# Patient Record
Sex: MALE | Race: BLACK OR AFRICAN AMERICAN | NOT HISPANIC OR LATINO | Employment: OTHER | URBAN - METROPOLITAN AREA
[De-identification: names, ages, dates, MRNs, and addresses within clinical notes are randomized per-mention and may not be internally consistent; named-entity substitution may affect disease eponyms.]

---

## 2023-10-24 ENCOUNTER — HOSPITAL ENCOUNTER (OUTPATIENT)
Facility: HOSPITAL | Age: 68
Discharge: HOME | End: 2023-11-06
Attending: EMERGENCY MEDICINE | Admitting: STUDENT IN AN ORGANIZED HEALTH CARE EDUCATION/TRAINING PROGRAM
Payer: MEDICARE

## 2023-10-24 DIAGNOSIS — T14.90XA TRAUMA: ICD-10-CM

## 2023-10-24 DIAGNOSIS — R00.1 BRADYCARDIA: ICD-10-CM

## 2023-10-24 DIAGNOSIS — S82.402A TIBIA/FIBULA FRACTURE, LEFT, CLOSED, INITIAL ENCOUNTER: Primary | ICD-10-CM

## 2023-10-24 DIAGNOSIS — S82.202A TIBIA/FIBULA FRACTURE, LEFT, CLOSED, INITIAL ENCOUNTER: Primary | ICD-10-CM

## 2023-10-24 DIAGNOSIS — Z01.818 PRE-OP EVALUATION: ICD-10-CM

## 2023-10-24 LAB
ABORH RETYPE: NORMAL
ALBUMIN SERPL-MCNC: 3.4 G/DL (ref 3.4–4.8)
ALBUMIN/GLOB SERPL: 1.3 RATIO (ref 1.1–2)
ALP SERPL-CCNC: 58 UNIT/L (ref 40–150)
ALT SERPL-CCNC: 25 UNIT/L (ref 0–55)
AST SERPL-CCNC: 24 UNIT/L (ref 5–34)
BASOPHILS # BLD AUTO: 0.04 X10(3)/MCL
BASOPHILS NFR BLD AUTO: 0.8 %
BILIRUB SERPL-MCNC: 0.7 MG/DL
BUN SERPL-MCNC: 20.1 MG/DL (ref 8.4–25.7)
CALCIUM SERPL-MCNC: 8.5 MG/DL (ref 8.8–10)
CHLORIDE SERPL-SCNC: 109 MMOL/L (ref 98–107)
CO2 SERPL-SCNC: 23 MMOL/L (ref 23–31)
CREAT SERPL-MCNC: 0.85 MG/DL (ref 0.73–1.18)
EOSINOPHIL # BLD AUTO: 0.06 X10(3)/MCL (ref 0–0.9)
EOSINOPHIL NFR BLD AUTO: 1.2 %
ERYTHROCYTE [DISTWIDTH] IN BLOOD BY AUTOMATED COUNT: 16 % (ref 11.5–17)
ETHANOL SERPL-MCNC: <10 MG/DL
GFR SERPLBLD CREATININE-BSD FMLA CKD-EPI: >60 MLS/MIN/1.73/M2
GLOBULIN SER-MCNC: 2.7 GM/DL (ref 2.4–3.5)
GLUCOSE SERPL-MCNC: 82 MG/DL (ref 82–115)
GROUP & RH: NORMAL
HCT VFR BLD AUTO: 38.4 % (ref 42–52)
HGB BLD-MCNC: 13 G/DL (ref 14–18)
IMM GRANULOCYTES # BLD AUTO: 0.01 X10(3)/MCL (ref 0–0.04)
IMM GRANULOCYTES NFR BLD AUTO: 0.2 %
INDIRECT COOMBS GEL: NORMAL
INR PPP: 1
LYMPHOCYTES # BLD AUTO: 1.2 X10(3)/MCL (ref 0.6–4.6)
LYMPHOCYTES NFR BLD AUTO: 23.4 %
MCH RBC QN AUTO: 28.3 PG (ref 27–31)
MCHC RBC AUTO-ENTMCNC: 33.9 G/DL (ref 33–36)
MCV RBC AUTO: 83.7 FL (ref 80–94)
MONOCYTES # BLD AUTO: 0.46 X10(3)/MCL (ref 0.1–1.3)
MONOCYTES NFR BLD AUTO: 9 %
NEUTROPHILS # BLD AUTO: 3.36 X10(3)/MCL (ref 2.1–9.2)
NEUTROPHILS NFR BLD AUTO: 65.4 %
NRBC BLD AUTO-RTO: 0 %
PLATELET # BLD AUTO: 218 X10(3)/MCL (ref 130–400)
PMV BLD AUTO: 10.2 FL (ref 7.4–10.4)
POTASSIUM SERPL-SCNC: 3.9 MMOL/L (ref 3.5–5.1)
PROT SERPL-MCNC: 6.1 GM/DL (ref 5.8–7.6)
PROTHROMBIN TIME: 13.4 SECONDS (ref 12.5–14.5)
RBC # BLD AUTO: 4.59 X10(6)/MCL (ref 4.7–6.1)
SODIUM SERPL-SCNC: 140 MMOL/L (ref 136–145)
SPECIMEN OUTDATE: NORMAL
WBC # SPEC AUTO: 5.13 X10(3)/MCL (ref 4.5–11.5)

## 2023-10-24 PROCEDURE — 63600175 PHARM REV CODE 636 W HCPCS: Performed by: EMERGENCY MEDICINE

## 2023-10-24 PROCEDURE — 11000001 HC ACUTE MED/SURG PRIVATE ROOM

## 2023-10-24 PROCEDURE — 99285 EMERGENCY DEPT VISIT HI MDM: CPT | Mod: 25

## 2023-10-24 PROCEDURE — 93010 ELECTROCARDIOGRAM REPORT: CPT | Mod: ,,, | Performed by: INTERNAL MEDICINE

## 2023-10-24 PROCEDURE — 80053 COMPREHEN METABOLIC PANEL: CPT | Performed by: EMERGENCY MEDICINE

## 2023-10-24 PROCEDURE — 85610 PROTHROMBIN TIME: CPT | Performed by: EMERGENCY MEDICINE

## 2023-10-24 PROCEDURE — 63600175 PHARM REV CODE 636 W HCPCS

## 2023-10-24 PROCEDURE — G0378 HOSPITAL OBSERVATION PER HR: HCPCS

## 2023-10-24 PROCEDURE — 96375 TX/PRO/DX INJ NEW DRUG ADDON: CPT | Mod: 59

## 2023-10-24 PROCEDURE — 25000003 PHARM REV CODE 250

## 2023-10-24 PROCEDURE — 25000003 PHARM REV CODE 250: Performed by: EMERGENCY MEDICINE

## 2023-10-24 PROCEDURE — 96372 THER/PROPH/DIAG INJ SC/IM: CPT | Mod: 59

## 2023-10-24 PROCEDURE — 86900 BLOOD TYPING SEROLOGIC ABO: CPT | Performed by: EMERGENCY MEDICINE

## 2023-10-24 PROCEDURE — 93005 ELECTROCARDIOGRAM TRACING: CPT | Mod: 59

## 2023-10-24 PROCEDURE — 85025 COMPLETE CBC W/AUTO DIFF WBC: CPT | Performed by: EMERGENCY MEDICINE

## 2023-10-24 PROCEDURE — 93010 EKG 12-LEAD: ICD-10-PCS | Mod: ,,, | Performed by: INTERNAL MEDICINE

## 2023-10-24 PROCEDURE — 96361 HYDRATE IV INFUSION ADD-ON: CPT

## 2023-10-24 PROCEDURE — 82077 ASSAY SPEC XCP UR&BREATH IA: CPT | Performed by: EMERGENCY MEDICINE

## 2023-10-24 PROCEDURE — 29505 APPLICATION LONG LEG SPLINT: CPT | Mod: LT

## 2023-10-24 RX ORDER — POLYETHYLENE GLYCOL 3350 17 G/17G
17 POWDER, FOR SOLUTION ORAL 2 TIMES DAILY
Status: DISCONTINUED | OUTPATIENT
Start: 2023-10-24 | End: 2023-11-06 | Stop reason: HOSPADM

## 2023-10-24 RX ORDER — METHOCARBAMOL 500 MG/1
500 TABLET, FILM COATED ORAL EVERY 8 HOURS
Status: DISCONTINUED | OUTPATIENT
Start: 2023-10-24 | End: 2023-11-03

## 2023-10-24 RX ORDER — SODIUM CHLORIDE, SODIUM LACTATE, POTASSIUM CHLORIDE, CALCIUM CHLORIDE 600; 310; 30; 20 MG/100ML; MG/100ML; MG/100ML; MG/100ML
INJECTION, SOLUTION INTRAVENOUS CONTINUOUS
Status: DISCONTINUED | OUTPATIENT
Start: 2023-10-24 | End: 2023-10-26

## 2023-10-24 RX ORDER — DOCUSATE SODIUM 100 MG/1
100 CAPSULE, LIQUID FILLED ORAL 2 TIMES DAILY
Status: DISCONTINUED | OUTPATIENT
Start: 2023-10-24 | End: 2023-11-06 | Stop reason: HOSPADM

## 2023-10-24 RX ORDER — PROPOFOL 10 MG/ML
INJECTION, EMULSION INTRAVENOUS
Status: COMPLETED
Start: 2023-10-24 | End: 2023-10-24

## 2023-10-24 RX ORDER — ENOXAPARIN SODIUM 100 MG/ML
40 INJECTION SUBCUTANEOUS EVERY 12 HOURS
Status: DISCONTINUED | OUTPATIENT
Start: 2023-10-24 | End: 2023-11-06 | Stop reason: HOSPADM

## 2023-10-24 RX ORDER — ADHESIVE BANDAGE
30 BANDAGE TOPICAL DAILY PRN
Status: DISCONTINUED | OUTPATIENT
Start: 2023-10-24 | End: 2023-11-06 | Stop reason: HOSPADM

## 2023-10-24 RX ORDER — CEFAZOLIN SODIUM 2 G/50ML
2 SOLUTION INTRAVENOUS
Status: DISCONTINUED | OUTPATIENT
Start: 2023-10-24 | End: 2023-10-24

## 2023-10-24 RX ORDER — GABAPENTIN 300 MG/1
300 CAPSULE ORAL 3 TIMES DAILY
Status: DISCONTINUED | OUTPATIENT
Start: 2023-10-24 | End: 2023-11-06 | Stop reason: HOSPADM

## 2023-10-24 RX ORDER — OXYCODONE HYDROCHLORIDE 5 MG/1
5 TABLET ORAL EVERY 4 HOURS PRN
Status: DISCONTINUED | OUTPATIENT
Start: 2023-10-24 | End: 2023-11-06 | Stop reason: HOSPADM

## 2023-10-24 RX ORDER — MORPHINE SULFATE 4 MG/ML
INJECTION, SOLUTION INTRAMUSCULAR; INTRAVENOUS
Status: COMPLETED
Start: 2023-10-24 | End: 2023-10-24

## 2023-10-24 RX ORDER — TALC
6 POWDER (GRAM) TOPICAL NIGHTLY PRN
Status: DISCONTINUED | OUTPATIENT
Start: 2023-10-24 | End: 2023-11-06 | Stop reason: HOSPADM

## 2023-10-24 RX ORDER — HYDROMORPHONE HYDROCHLORIDE 2 MG/ML
0.5 INJECTION, SOLUTION INTRAMUSCULAR; INTRAVENOUS; SUBCUTANEOUS
Status: COMPLETED | OUTPATIENT
Start: 2023-10-24 | End: 2023-10-24

## 2023-10-24 RX ORDER — ACETAMINOPHEN 325 MG/1
650 TABLET ORAL EVERY 4 HOURS
Status: DISCONTINUED | OUTPATIENT
Start: 2023-10-24 | End: 2023-10-25

## 2023-10-24 RX ORDER — OXYCODONE HYDROCHLORIDE 10 MG/1
10 TABLET ORAL EVERY 4 HOURS PRN
Status: DISCONTINUED | OUTPATIENT
Start: 2023-10-24 | End: 2023-11-06 | Stop reason: HOSPADM

## 2023-10-24 RX ORDER — ONDANSETRON 2 MG/ML
INJECTION INTRAMUSCULAR; INTRAVENOUS
Status: COMPLETED
Start: 2023-10-24 | End: 2023-10-24

## 2023-10-24 RX ADMIN — GABAPENTIN 300 MG: 300 CAPSULE ORAL at 04:10

## 2023-10-24 RX ADMIN — ENOXAPARIN SODIUM 40 MG: 40 INJECTION SUBCUTANEOUS at 09:10

## 2023-10-24 RX ADMIN — GABAPENTIN 300 MG: 300 CAPSULE ORAL at 09:10

## 2023-10-24 RX ADMIN — ONDANSETRON 4 MG: 2 INJECTION INTRAMUSCULAR; INTRAVENOUS at 02:10

## 2023-10-24 RX ADMIN — MORPHINE SULFATE 10 MG: 4 INJECTION, SOLUTION INTRAMUSCULAR; INTRAVENOUS at 02:10

## 2023-10-24 RX ADMIN — SODIUM CHLORIDE 1000 ML: 9 INJECTION, SOLUTION INTRAVENOUS at 02:10

## 2023-10-24 RX ADMIN — METHOCARBAMOL 500 MG: 500 TABLET ORAL at 09:10

## 2023-10-24 RX ADMIN — PROPOFOL 100 MG: 10 INJECTION, EMULSION INTRAVENOUS at 03:10

## 2023-10-24 RX ADMIN — SODIUM CHLORIDE, POTASSIUM CHLORIDE, SODIUM LACTATE AND CALCIUM CHLORIDE: 600; 310; 30; 20 INJECTION, SOLUTION INTRAVENOUS at 08:10

## 2023-10-24 RX ADMIN — OXYCODONE HYDROCHLORIDE 10 MG: 10 TABLET ORAL at 08:10

## 2023-10-24 RX ADMIN — ACETAMINOPHEN 650 MG: 325 TABLET, FILM COATED ORAL at 09:10

## 2023-10-24 RX ADMIN — HYDROMORPHONE HYDROCHLORIDE 0.5 MG: 2 INJECTION INTRAMUSCULAR; INTRAVENOUS; SUBCUTANEOUS at 04:10

## 2023-10-24 NOTE — ED PROVIDER NOTES
Encounter Date: 10/24/2023    SCRIBE #1 NOTE: I, Nuria Swain, am scribing for, and in the presence of,  Lico Swan III, MD. I have scribed the following portions of the note - Other sections scribed: HPI, ROS, PE.       History     Chief Complaint   Patient presents with    Leg Pain     Pt reports falling through wood decking while building a wheelchair ramp, leg was caught, causing injury to LLE with deformity. Pt splinted by EMS [facility], CMS intact, pulse +2.      67 year old male presents to the ED via [facility] for leg fracture. Pt reports that he was on a wooden deck when he fell through it and got his foot caught between a piece of wood and concrete. He states that he was twisting trying to get out of it and injured his left leg. He denies hitting his head, neck pain, arm pain, chest pain, and SOB.     The history is provided by the patient. No  was used.     Review of patient's allergies indicates:  No Known Allergies  Past Medical History:   Diagnosis Date    Hypertension      History reviewed. No pertinent surgical history.  History reviewed. No pertinent family history.  Social History     Substance Use Topics    Alcohol use: Yes     Review of Systems   Respiratory:  Negative for shortness of breath.    Cardiovascular:  Negative for chest pain.   Musculoskeletal:  Negative for neck pain.        LLE pain. Denies arm pain       Physical Exam     Initial Vitals [10/24/23 1414]   BP Pulse Resp Temp SpO2   113/62 82 16 97.6 °F (36.4 °C) 97 %      MAP       --         Physical Exam    Nursing note and vitals reviewed.  Constitutional: No distress.   HENT:   Head: Normocephalic and atraumatic.   Neck: Trachea normal.   Cardiovascular:  Normal rate and regular rhythm.           No murmur heard.  Pulmonary/Chest: Breath sounds normal. No respiratory distress.   Abdominal: Abdomen is soft. Bowel sounds are normal. He exhibits no distension. There is no abdominal tenderness.    Musculoskeletal:         General: Normal range of motion.      Lumbar back: Normal.      Comments: Left mid shaft tib-fib deformity.      Neurological: He is alert and oriented to person, place, and time. He has normal strength. No cranial nerve deficit.   Skin: Skin is warm and dry. No rash noted.   Psychiatric: He has a normal mood and affect. Judgment normal.         ED Course   Procedural Sedation        Date/Time: 10/24/2023 4:27 PM    Performed by: Lico Swan III, MD  Authorized by: Lico Swan III, MD  ASA Class: Class 2 - Mild Illness without functional impairment.  Mallampati Score: Class 2 - Visualization of the soft palate, fauces, and uvula.     Equipment: on BP monitor., on CO2 monitor., on supplemental oxygen., suction available., on cardiac monitor., airway equipment available. and reversal drugs available.     Sedation type: moderate (conscious) sedation    Sedatives: propofol  Total Sedation Time (min): 10  Vitals: Vital signs were monitored during sedation.  Complications: No complications.       Orthopedic Injury    Date/Time: 10/24/2023 4:27 PM    Performed by: Lico Swan III, MD  Authorized by: Lico Swan III, MD    Location procedure was performed:  [facility] EMERGENCY DEPARTMENT  Injury:     Injury location:  Lower leg    Injury type:  Fracture    Fracture type: tibial and fibular shafts        Pre-procedure assessment:     Neurovascular status: Neurovascularly intact      Distal perfusion: normal      Neurological function: normal      Range of motion: normal      Local anesthesia used?: No      Patient sedated: See conscious sedation note.        Selections made in this section will also lock the Injury type section above.:     Immobilization:  Splint    Splint type:  Long leg    Supplies used:  Ortho-Glass    Technical Procedures Used:  Long leg stirrups with posterior traction  Post-procedure assessment:     Neurovascular status: Neurovascularly intact      Distal  perfusion: normal      Neurological function: normal      Range of motion: normal      Patient tolerance:  Patient tolerated the procedure well with no immediate complications    Labs Reviewed   CBC WITH DIFFERENTIAL - Abnormal; Notable for the following components:       Result Value    RBC 4.59 (*)     Hgb 13.0 (*)     Hct 38.4 (*)     All other components within normal limits   COMPREHENSIVE METABOLIC PANEL - Abnormal; Notable for the following components:    Chloride 109 (*)     Calcium Level Total 8.5 (*)     All other components within normal limits   PROTIME-INR - Normal   ALCOHOL,MEDICAL (ETHANOL) - Normal   CBC W/ AUTO DIFFERENTIAL    Narrative:     The following orders were created for panel order CBC auto differential.  Procedure                               Abnormality         Status                     ---------                               -----------         ------                     CBC with Differential[[id number]]       Abnormal            Final result                 Please view results for these tests on the individual orders.   URINALYSIS, REFLEX TO URINE CULTURE   TYPE & SCREEN   ABORH RETYPE          Imaging Results              X-Ray Tibia Fibula 2 View Left (Final result)  Result time 10/24/23 15:37:23      Final result by Shayy Carmichael MD (10/24/23 15:37:23)                   Impression:      Improved alignment of the tibial and fibular fractures following reduction.      Electronically signed by: Shayy Carmichael  Date:    10/24/2023  Time:    15:37               Narrative:    EXAMINATION:  XR TIBIA FIBULA 2 VIEW LEFT    CLINICAL HISTORY:  Injury, unspecified, initial encounter    COMPARISON:  X-rays dated 10/24/2023    FINDINGS:  There is improved alignment of the tibial and fibular diaphyseal fractures following reduction.  Splinting material is in place.                                       X-Ray Chest 1 View (Final result)  Result time 10/24/23 15:36:33      Final result by  Shayy Carmichael MD (10/24/23 15:36:33)                   Impression:      Left basilar subsegmental atelectasis.      Electronically signed by: Shayy Carmichael  Date:    10/24/2023  Time:    15:36               Narrative:    EXAMINATION:  XR CHEST 1 VIEW    CLINICAL HISTORY:  weakness;    TECHNIQUE:  AP chest    COMPARISON:  None.    FINDINGS:  The heart is normal in size.  There is left basilar subsegmental axis.  There is otherwise no focal airspace consolidation.  There is no pleural effusion or visible pneumothorax.                                       X-Ray Tibia Fibula 2 View Left (Final result)  Result time 10/24/23 15:00:09      Final result by Duane Muller MD (10/24/23 15:00:09)                   Impression:      Fractures as above.      Electronically signed by: Duane Muller  Date:    10/24/2023  Time:    15:00               Narrative:    EXAMINATION:  XR TIBIA FIBULA 2 VIEW LEFT    CLINICAL HISTORY:  Injury, unspecified, initial encounter    COMPARISON:  None.    FINDINGS:  Comminuted displace fractures of the proximal to midshaft of both the tibia and fibula with over riding of fracture fragments    Joint spaces preserved.    No blastic or lytic lesions.    Soft tissues within normal limits.                                       X-Ray Knee Complete 4 or More Views Left (Final result)  Result time 10/24/23 14:56:26      Final result by Duane Muller MD (10/24/23 14:56:26)                   Impression:      Fractures as above.      Electronically signed by: Duane Muller  Date:    10/24/2023  Time:    14:56               Narrative:    EXAMINATION:  XR KNEE COMP 4 OR MORE VIEWS LEFT    CLINICAL HISTORY:  Injury, unspecified, initial encounter    COMPARISON:  None.    FINDINGS:  Markedly displaced comminuted fracture involving the proximal to mid 3rd of the tibia and the proximal 3rd of the fibula with significant displacement and over riding of fracture fragments    There is  some narrowing of the medial compartment of the knee joint    No blastic or lytic lesions.    Soft tissues within normal limits.                                       X-Ray Ankle Complete Left (Final result)  Result time 10/24/23 15:11:56      Final result by Shayy Carmichael MD (10/24/23 15:11:56)                   Impression:      No acute abnormality identified in the ankle.      Electronically signed by: Shayy Carmichael  Date:    10/24/2023  Time:    15:11               Narrative:    EXAMINATION:  XR ANKLE COMPLETE 3 VIEW LEFT    CLINICAL HISTORY:  Injury, unspecified, initial encounter    COMPARISON:  None.    FINDINGS:  Splinting material obscures detail.  There is no acute fracture or malalignment in the ankle.  There are fractures of the mid tibia and fibular diaphyses.                                       Medications   lactated ringers infusion (has no administration in time range)   enoxaparin injection 40 mg (has no administration in time range)   acetaminophen tablet 650 mg (has no administration in time range)   oxyCODONE immediate release tablet 5 mg (has no administration in time range)   oxyCODONE immediate release tablet Tab 10 mg (has no administration in time range)   methocarbamoL tablet 500 mg (has no administration in time range)   gabapentin capsule 300 mg (has no administration in time range)   melatonin tablet 6 mg (has no administration in time range)   polyethylene glycol packet 17 g (has no administration in time range)   docusate sodium capsule 100 mg (has no administration in time range)   magnesium hydroxide 400 mg/5 ml suspension 2,400 mg (has no administration in time range)   HYDROmorphone (PF) injection 0.5 mg (has no administration in time range)   morphine 4 mg/mL injection (10 mg intravenous push Given 10/24/23 1435)   ondansetron 4 mg/2 mL injection (4 mg  Given 10/24/23 1446)   sodium chloride 0.9% bolus 1,000 mL 1,000 mL (1,000 mLs Intravenous New Bag 10/24/23 1447)    propofoL (DIPRIVAN) 10 mg/mL infusion (100 mg intravenous push New Bag 10/24/23 1501)     Medical Decision Making  Fracture dislocation    Patient with midshaft tib-fib fracture on x-ray neurovascularly intact but significant angulation and puckering of skin, decision made to sedate patient and reduce reduction successful discussed case with orthopedics and with Neurosurgery patient will be admitted for ORIF    Problems Addressed:  Tibia/fibula fracture, left, closed, initial encounter: complicated acute illness or injury that poses a threat to life or bodily functions    Amount and/or Complexity of Data Reviewed  External Data Reviewed: notes.  Labs: ordered.  Radiology: ordered.    Risk  Decision regarding hospitalization.              Attending Attestation:           Physician Attestation for Scribe:  Physician Attestation Statement for Scribe #1: I, Lico Swan III, MD, reviewed documentation, as scribed by Nuria Swain in my presence, and it is both accurate and complete.                             Clinical Impression:   Final diagnoses:  [T14.90XA] Trauma  [Z01.818] Pre-op evaluation  [S82.202A, S82.402A] Tibia/fibula fracture, left, closed, initial encounter (Primary)        ED Disposition Condition    Admit                 Lico Swan III, MD  10/24/23 9223

## 2023-10-24 NOTE — ED NOTES
Pt tolerated conscious sedation procedure well. 100mg Propofol IV push was given by Dr. Swan for procedure of LLE tib/fib reduction with splinting. Pt was bolused with 1L NS IVF. O2 was provided via NC at 4L with ETCO2 monitoring during procedure. Splint applied, CMS intact, pedal pulse palpable, +2, nail beds pink with cap refill <3 sec. Pt awakens post-procedure, following commands, oriented x3, no distress, pt placed on room air, able to maintain O2 sat above 95%. XR called for post-procedure films. Pt verbalizes improvement in pain. Pt remains on monitors, primary nurse, Rene Gamboa RN provided bedside report and will continue to monitor patient. Pt's family was updated by phone. Pt to be admitted for ortho services. Pt voices understanding of plan of care.

## 2023-10-24 NOTE — H&P
"   Trauma Surgery   History and Physical Note    Patient Name: Artemio Ramsey  YOB: 1955  Date: 10/24/2023 3:37 PM  Date of Admission: 10/24/2023  HD#0  POD#* No surgery found *    PRESENTING HISTORY   Chief Complaint/Reason for Admission: <principal problem not specified>    History of Present Illness:  Artemio Ramsey is a 67-year-old male who presents to the ED via air med for left tib-fib fracture.  Patient reports he was on a wooden deck when he tripped over it.  Complains of left leg pain.  Denies hitting his head.  Denies LOC.  Denies blood thinner use.    Review of Systems:  12 point ROS negative except as stated in HPI    PAST HISTORY:   Past medical history:  HLD  Denies taking home medications  Has not seen a PCP "in a while"    Past surgical history:  No past surgical history on file.    Family history:  No family history on file.    Social history:  Social History     Socioeconomic History    Marital status: Single     Social History     Tobacco Use   Smoking Status Not on file   Smokeless Tobacco Not on file      Social History     Substance and Sexual Activity   Alcohol Use Not on file        MEDICATIONS & ALLERGIES:   Allergies: Review of patient's allergies indicates:  No Known Allergies  Home Meds: No current outpatient medications   No current facility-administered medications on file prior to encounter.     No current outpatient medications on file prior to encounter.      No current facility-administered medications on file prior to encounter.     No current outpatient medications on file prior to encounter.     Scheduled Meds:   sodium chloride 0.9%  1,000 mL Intravenous ED 1 Time     Continuous Infusions:  PRN Meds:    OBJECTIVE:   Vital Signs:  VITAL SIGNS: 24 HR MIN & MAX LAST   Temp  Min: 97.6 °F (36.4 °C)  Max: 97.6 °F (36.4 °C)  97.6 °F (36.4 °C)   BP  Min: 81/48  Max: 131/76  115/67    Pulse  Min: 73  Max: 84  73    Resp  Min: 14  Max: 18  15    SpO2  Min: 96 %  Max: 100 %  " "100 %      HT: 5' 11" (180.3 cm)  WT: 68 kg (150 lb)  BMI: 20.9   Intake/output: No intake/output data recorded.     Lines/drains/airway:       Peripheral IV - Single Lumen 10/24/23 1420 20 G Anterior;Distal;Left Forearm (Active)   Number of days: 0       Physical Exam:  General:  Well developed, well nourished, no acute distress  HEENT:  Normocephalic, atraumatic  CV:  RR, 2+ DPs bilaterally  Resp/chest: NWOB  GI:  Abdomen soft, non-tender, non-distended  :  Deferred  MSK:  No muscle atrophy, cyanosis, peripheral edema, LLE reduced/splint - able to wiggle toes  Neuro: GCS 15. CNII-XII grossly intact, alert and oriented to person, place, and time. Strength and motor function grossly intact to all extremities, sensation intact to all extremities.    Labs:  Troponin:  No results for input(s): "TROPONINI" in the last 72 hours.  CBC:  Recent Labs     10/24/23  1457   WBC 5.13   RBC 4.59*   HGB 13.0*   HCT 38.4*      MCV 83.7   MCH 28.3   MCHC 33.9     CMP:  No results for input(s): "GLU", "CALCIUM", "ALBUMIN", "PROT", "NA", "K", "CO2", "CL", "BUN", "CREATININE", "ALKPHOS", "ALT", "AST", "BILITOT" in the last 72 hours.  Lactic Acid:  No results for input(s): "LACTATE" in the last 72 hours.  ETOH:  No results for input(s): "ETHANOL" in the last 72 hours.   Urine Drug Screen:  No results for input(s): "COCAINE", "OPIATE", "BARBITURATE", "AMPHETAMINE", "FENTANYL", "CANNABINOIDS", "MDMA" in the last 72 hours.    Invalid input(s): "BENZODIAZEPINE", "PHENCYCLIDINE"   ABG  No results for input(s): "PH", "PO2", "PCO2", "HCO3", "BE" in the last 168 hours.      Diagnostic Results:  X-Ray Tibia Fibula 2 View Left   Final Result      Fractures as above.         Electronically signed by: Duane Muller   Date:    10/24/2023   Time:    15:00      X-Ray Knee Complete 4 or More Views Left   Final Result      Fractures as above.         Electronically signed by: Duane Muller   Date:    10/24/2023   Time:    14:56    "   X-Ray Ankle Complete Left   Final Result      No acute abnormality identified in the ankle.         Electronically signed by: Shayy Carmichael   Date:    10/24/2023   Time:    15:11      X-Ray Tibia Fibula 2 View Left    (Results Pending)   X-Ray Chest 1 View    (Results Pending)       ASSESSMENT & PLAN:    Left tibia/fibula comminuted fracture        Admit to trauma floor  Consult orthopedic surgery, NWB to affected extremity until evaluated  NV checks  NPO  Daily labs  IS  PT/OT when able      10/24/2023 3:38 PM     The above findings, diagnostics, and treatment plan were discussed with Dr. Eduin Mendoza who will follow with further assessments and recommendations. Please call with any questions, concerns, or clinical status changes.  This note/OR report was created with the assistance of  voice recognition software or phone  dictation.  There may be transcription errors as a result of using this technology however minimal. Effort has been made to assure accuracy of transcription but any obvious errors or omissions should be clarified with the author of the document.

## 2023-10-25 ENCOUNTER — ANESTHESIA EVENT (OUTPATIENT)
Dept: SURGERY | Facility: HOSPITAL | Age: 68
End: 2023-10-25
Payer: MEDICARE

## 2023-10-25 ENCOUNTER — ANESTHESIA (OUTPATIENT)
Dept: SURGERY | Facility: HOSPITAL | Age: 68
End: 2023-10-25
Payer: MEDICARE

## 2023-10-25 PROBLEM — T14.90XA TRAUMA: Status: ACTIVE | Noted: 2023-10-25

## 2023-10-25 LAB
ALBUMIN SERPL-MCNC: 3.3 G/DL (ref 3.4–4.8)
ALBUMIN/GLOB SERPL: 1.2 RATIO (ref 1.1–2)
ALP SERPL-CCNC: 60 UNIT/L (ref 40–150)
ALT SERPL-CCNC: 31 UNIT/L (ref 0–55)
APPEARANCE UR: CLEAR
AST SERPL-CCNC: 42 UNIT/L (ref 5–34)
BACTERIA #/AREA URNS AUTO: NORMAL /HPF
BASOPHILS # BLD AUTO: 0.02 X10(3)/MCL
BASOPHILS NFR BLD AUTO: 0.4 %
BILIRUB SERPL-MCNC: 1.4 MG/DL
BILIRUB UR QL STRIP.AUTO: NEGATIVE
BUN SERPL-MCNC: 16.1 MG/DL (ref 8.4–25.7)
CALCIUM SERPL-MCNC: 8.5 MG/DL (ref 8.8–10)
CHLORIDE SERPL-SCNC: 106 MMOL/L (ref 98–107)
CO2 SERPL-SCNC: 24 MMOL/L (ref 23–31)
COLOR UR AUTO: YELLOW
CREAT SERPL-MCNC: 0.77 MG/DL (ref 0.73–1.18)
EOSINOPHIL # BLD AUTO: 0.07 X10(3)/MCL (ref 0–0.9)
EOSINOPHIL NFR BLD AUTO: 1.2 %
ERYTHROCYTE [DISTWIDTH] IN BLOOD BY AUTOMATED COUNT: 16.1 % (ref 11.5–17)
GFR SERPLBLD CREATININE-BSD FMLA CKD-EPI: >60 MLS/MIN/1.73/M2
GLOBULIN SER-MCNC: 2.8 GM/DL (ref 2.4–3.5)
GLUCOSE SERPL-MCNC: 89 MG/DL (ref 82–115)
GLUCOSE UR QL STRIP.AUTO: NEGATIVE
HCT VFR BLD AUTO: 36.3 % (ref 42–52)
HGB BLD-MCNC: 12.2 G/DL (ref 14–18)
IMM GRANULOCYTES # BLD AUTO: 0.01 X10(3)/MCL (ref 0–0.04)
IMM GRANULOCYTES NFR BLD AUTO: 0.2 %
KETONES UR QL STRIP.AUTO: ABNORMAL
LEUKOCYTE ESTERASE UR QL STRIP.AUTO: NEGATIVE
LYMPHOCYTES # BLD AUTO: 1.49 X10(3)/MCL (ref 0.6–4.6)
LYMPHOCYTES NFR BLD AUTO: 26.4 %
MAGNESIUM SERPL-MCNC: 1.5 MG/DL (ref 1.6–2.6)
MCH RBC QN AUTO: 28.8 PG (ref 27–31)
MCHC RBC AUTO-ENTMCNC: 33.6 G/DL (ref 33–36)
MCV RBC AUTO: 85.8 FL (ref 80–94)
MONOCYTES # BLD AUTO: 0.58 X10(3)/MCL (ref 0.1–1.3)
MONOCYTES NFR BLD AUTO: 10.3 %
NEUTROPHILS # BLD AUTO: 3.47 X10(3)/MCL (ref 2.1–9.2)
NEUTROPHILS NFR BLD AUTO: 61.5 %
NITRITE UR QL STRIP.AUTO: NEGATIVE
NRBC BLD AUTO-RTO: 0 %
PH UR STRIP.AUTO: 5 [PH]
PHOSPHATE SERPL-MCNC: 3.1 MG/DL (ref 2.3–4.7)
PLATELET # BLD AUTO: 185 X10(3)/MCL (ref 130–400)
PMV BLD AUTO: 9.5 FL (ref 7.4–10.4)
POTASSIUM SERPL-SCNC: 4.3 MMOL/L (ref 3.5–5.1)
PROT SERPL-MCNC: 6.1 GM/DL (ref 5.8–7.6)
PROT UR QL STRIP.AUTO: NEGATIVE
RBC # BLD AUTO: 4.23 X10(6)/MCL (ref 4.7–6.1)
RBC #/AREA URNS AUTO: <5 /HPF
RBC UR QL AUTO: NEGATIVE
SODIUM SERPL-SCNC: 137 MMOL/L (ref 136–145)
SP GR UR STRIP.AUTO: 1.02 (ref 1–1.03)
SQUAMOUS #/AREA URNS AUTO: <5 /HPF
UROBILINOGEN UR STRIP-ACNC: 0.2
WBC # SPEC AUTO: 5.64 X10(3)/MCL (ref 4.5–11.5)
WBC #/AREA URNS AUTO: <5 /HPF

## 2023-10-25 PROCEDURE — 27759 TREATMENT OF TIBIA FRACTURE: CPT | Mod: LT,,, | Performed by: ORTHOPAEDIC SURGERY

## 2023-10-25 PROCEDURE — 93010 EKG 12-LEAD: ICD-10-PCS | Mod: ,,, | Performed by: INTERNAL MEDICINE

## 2023-10-25 PROCEDURE — 27000221 HC OXYGEN, UP TO 24 HOURS

## 2023-10-25 PROCEDURE — 27759 TREATMENT OF TIBIA FRACTURE: CPT | Mod: AS,LT,, | Performed by: PHYSICIAN ASSISTANT

## 2023-10-25 PROCEDURE — 25000003 PHARM REV CODE 250: Performed by: ORTHOPAEDIC SURGERY

## 2023-10-25 PROCEDURE — 80053 COMPREHEN METABOLIC PANEL: CPT

## 2023-10-25 PROCEDURE — 36000710: Performed by: ORTHOPAEDIC SURGERY

## 2023-10-25 PROCEDURE — 25000003 PHARM REV CODE 250

## 2023-10-25 PROCEDURE — 63600175 PHARM REV CODE 636 W HCPCS: Performed by: ORTHOPAEDIC SURGERY

## 2023-10-25 PROCEDURE — G0378 HOSPITAL OBSERVATION PER HR: HCPCS

## 2023-10-25 PROCEDURE — D9220A PRA ANESTHESIA: ICD-10-PCS | Mod: CRNA,,,

## 2023-10-25 PROCEDURE — 27759 PR TREAT TIBIAL SHAFT FX, INTRAMED IMPLANT: ICD-10-PCS | Mod: LT,,, | Performed by: ORTHOPAEDIC SURGERY

## 2023-10-25 PROCEDURE — C1769 GUIDE WIRE: HCPCS | Performed by: ORTHOPAEDIC SURGERY

## 2023-10-25 PROCEDURE — 27201423 OPTIME MED/SURG SUP & DEVICES STERILE SUPPLY: Performed by: ORTHOPAEDIC SURGERY

## 2023-10-25 PROCEDURE — 96372 THER/PROPH/DIAG INJ SC/IM: CPT | Mod: 59

## 2023-10-25 PROCEDURE — 27759 PR TREAT TIBIAL SHAFT FX, INTRAMED IMPLANT: ICD-10-PCS | Mod: AS,LT,, | Performed by: PHYSICIAN ASSISTANT

## 2023-10-25 PROCEDURE — 83735 ASSAY OF MAGNESIUM: CPT

## 2023-10-25 PROCEDURE — 96365 THER/PROPH/DIAG IV INF INIT: CPT | Mod: 59

## 2023-10-25 PROCEDURE — 93010 ELECTROCARDIOGRAM REPORT: CPT | Mod: ,,, | Performed by: INTERNAL MEDICINE

## 2023-10-25 PROCEDURE — 81001 URINALYSIS AUTO W/SCOPE: CPT | Performed by: EMERGENCY MEDICINE

## 2023-10-25 PROCEDURE — D9220A PRA ANESTHESIA: Mod: ANES,,, | Performed by: STUDENT IN AN ORGANIZED HEALTH CARE EDUCATION/TRAINING PROGRAM

## 2023-10-25 PROCEDURE — 84100 ASSAY OF PHOSPHORUS: CPT

## 2023-10-25 PROCEDURE — 85025 COMPLETE CBC W/AUTO DIFF WBC: CPT

## 2023-10-25 PROCEDURE — 36000711: Performed by: ORTHOPAEDIC SURGERY

## 2023-10-25 PROCEDURE — 37000008 HC ANESTHESIA 1ST 15 MINUTES: Performed by: ORTHOPAEDIC SURGERY

## 2023-10-25 PROCEDURE — 37000009 HC ANESTHESIA EA ADD 15 MINS: Performed by: ORTHOPAEDIC SURGERY

## 2023-10-25 PROCEDURE — 99223 1ST HOSP IP/OBS HIGH 75: CPT | Mod: 57,,, | Performed by: ORTHOPAEDIC SURGERY

## 2023-10-25 PROCEDURE — D9220A PRA ANESTHESIA: ICD-10-PCS | Mod: ANES,,, | Performed by: STUDENT IN AN ORGANIZED HEALTH CARE EDUCATION/TRAINING PROGRAM

## 2023-10-25 PROCEDURE — 63600175 PHARM REV CODE 636 W HCPCS

## 2023-10-25 PROCEDURE — 96361 HYDRATE IV INFUSION ADD-ON: CPT | Mod: 59

## 2023-10-25 PROCEDURE — 96361 HYDRATE IV INFUSION ADD-ON: CPT

## 2023-10-25 PROCEDURE — 71000033 HC RECOVERY, INTIAL HOUR: Performed by: ORTHOPAEDIC SURGERY

## 2023-10-25 PROCEDURE — D9220A PRA ANESTHESIA: Mod: CRNA,,,

## 2023-10-25 PROCEDURE — 93005 ELECTROCARDIOGRAM TRACING: CPT | Mod: 59

## 2023-10-25 PROCEDURE — C1713 ANCHOR/SCREW BN/BN,TIS/BN: HCPCS | Performed by: ORTHOPAEDIC SURGERY

## 2023-10-25 PROCEDURE — 99223 PR INITIAL HOSPITAL CARE,LEVL III: ICD-10-PCS | Mod: 57,,, | Performed by: ORTHOPAEDIC SURGERY

## 2023-10-25 DEVICE — IMPLANTABLE DEVICE: Type: IMPLANTABLE DEVICE | Site: TIBIA | Status: FUNCTIONAL

## 2023-10-25 DEVICE — SCREW XL25 IM 36X5MM: Type: IMPLANTABLE DEVICE | Site: TIBIA | Status: FUNCTIONAL

## 2023-10-25 DEVICE — SCREW BONE LOCK IM NAIL 34X5MM: Type: IMPLANTABLE DEVICE | Site: TIBIA | Status: FUNCTIONAL

## 2023-10-25 DEVICE — SCREW XL25 IM 38X5MM: Type: IMPLANTABLE DEVICE | Site: TIBIA | Status: FUNCTIONAL

## 2023-10-25 DEVICE — SCREW LOK LP 5.0X34MM: Type: IMPLANTABLE DEVICE | Site: TIBIA | Status: FUNCTIONAL

## 2023-10-25 DEVICE — SCREW XL25 IM NAIL 36X5MM: Type: IMPLANTABLE DEVICE | Site: TIBIA | Status: FUNCTIONAL

## 2023-10-25 RX ORDER — DEXAMETHASONE SODIUM PHOSPHATE 4 MG/ML
INJECTION, SOLUTION INTRA-ARTICULAR; INTRALESIONAL; INTRAMUSCULAR; INTRAVENOUS; SOFT TISSUE
Status: DISCONTINUED | OUTPATIENT
Start: 2023-10-25 | End: 2023-10-25

## 2023-10-25 RX ORDER — PROPOFOL 10 MG/ML
VIAL (ML) INTRAVENOUS
Status: DISCONTINUED | OUTPATIENT
Start: 2023-10-25 | End: 2023-10-25

## 2023-10-25 RX ORDER — ACETAMINOPHEN 10 MG/ML
INJECTION, SOLUTION INTRAVENOUS
Status: DISCONTINUED | OUTPATIENT
Start: 2023-10-25 | End: 2023-10-25

## 2023-10-25 RX ORDER — CEFAZOLIN SODIUM 2 G/50ML
2 SOLUTION INTRAVENOUS
Status: COMPLETED | OUTPATIENT
Start: 2023-10-25 | End: 2023-10-25

## 2023-10-25 RX ORDER — ROCURONIUM BROMIDE 10 MG/ML
INJECTION, SOLUTION INTRAVENOUS
Status: DISCONTINUED | OUTPATIENT
Start: 2023-10-25 | End: 2023-10-25

## 2023-10-25 RX ORDER — HYDROMORPHONE HYDROCHLORIDE 2 MG/ML
0.4 INJECTION, SOLUTION INTRAMUSCULAR; INTRAVENOUS; SUBCUTANEOUS EVERY 5 MIN PRN
Status: DISCONTINUED | OUTPATIENT
Start: 2023-10-25 | End: 2023-10-25 | Stop reason: HOSPADM

## 2023-10-25 RX ORDER — FENTANYL CITRATE 50 UG/ML
INJECTION, SOLUTION INTRAMUSCULAR; INTRAVENOUS
Status: DISCONTINUED | OUTPATIENT
Start: 2023-10-25 | End: 2023-10-25

## 2023-10-25 RX ORDER — ACETAMINOPHEN 500 MG
1000 TABLET ORAL EVERY 8 HOURS
Status: DISPENSED | OUTPATIENT
Start: 2023-10-25 | End: 2023-10-26

## 2023-10-25 RX ORDER — CEFAZOLIN SODIUM 2 G/50ML
2 SOLUTION INTRAVENOUS
Status: COMPLETED | OUTPATIENT
Start: 2023-10-25 | End: 2023-10-26

## 2023-10-25 RX ORDER — LIDOCAINE HYDROCHLORIDE 20 MG/ML
INJECTION, SOLUTION EPIDURAL; INFILTRATION; INTRACAUDAL; PERINEURAL
Status: DISCONTINUED | OUTPATIENT
Start: 2023-10-25 | End: 2023-10-25

## 2023-10-25 RX ORDER — GLYCOPYRROLATE 0.2 MG/ML
INJECTION INTRAMUSCULAR; INTRAVENOUS
Status: DISCONTINUED | OUTPATIENT
Start: 2023-10-25 | End: 2023-10-25

## 2023-10-25 RX ORDER — ONDANSETRON HYDROCHLORIDE 2 MG/ML
INJECTION, SOLUTION INTRAMUSCULAR; INTRAVENOUS
Status: DISCONTINUED | OUTPATIENT
Start: 2023-10-25 | End: 2023-10-25

## 2023-10-25 RX ORDER — MIDAZOLAM HYDROCHLORIDE 1 MG/ML
INJECTION INTRAMUSCULAR; INTRAVENOUS
Status: DISCONTINUED | OUTPATIENT
Start: 2023-10-25 | End: 2023-10-25

## 2023-10-25 RX ORDER — SODIUM CHLORIDE 0.9 % (FLUSH) 0.9 %
10 SYRINGE (ML) INJECTION
Status: DISCONTINUED | OUTPATIENT
Start: 2023-10-25 | End: 2023-11-06 | Stop reason: HOSPADM

## 2023-10-25 RX ORDER — SODIUM CHLORIDE 0.9 % (FLUSH) 0.9 %
10 SYRINGE (ML) INJECTION
Status: DISCONTINUED | OUTPATIENT
Start: 2023-10-25 | End: 2023-10-25 | Stop reason: HOSPADM

## 2023-10-25 RX ORDER — EPHEDRINE SULFATE 50 MG/ML
INJECTION, SOLUTION INTRAVENOUS
Status: DISCONTINUED | OUTPATIENT
Start: 2023-10-25 | End: 2023-10-25

## 2023-10-25 RX ORDER — KETOROLAC TROMETHAMINE 30 MG/ML
INJECTION, SOLUTION INTRAMUSCULAR; INTRAVENOUS
Status: DISCONTINUED | OUTPATIENT
Start: 2023-10-25 | End: 2023-10-25

## 2023-10-25 RX ORDER — HYDROMORPHONE HYDROCHLORIDE 2 MG/ML
1 INJECTION, SOLUTION INTRAMUSCULAR; INTRAVENOUS; SUBCUTANEOUS EVERY 4 HOURS PRN
Status: DISCONTINUED | OUTPATIENT
Start: 2023-10-25 | End: 2023-11-06 | Stop reason: HOSPADM

## 2023-10-25 RX ADMIN — GABAPENTIN 300 MG: 300 CAPSULE ORAL at 02:10

## 2023-10-25 RX ADMIN — PROPOFOL 70 MG: 10 INJECTION, EMULSION INTRAVENOUS at 09:10

## 2023-10-25 RX ADMIN — ONDANSETRON 4 MG: 2 INJECTION INTRAMUSCULAR; INTRAVENOUS at 10:10

## 2023-10-25 RX ADMIN — DEXAMETHASONE SODIUM PHOSPHATE 8 MG: 4 INJECTION, SOLUTION INTRA-ARTICULAR; INTRALESIONAL; INTRAMUSCULAR; INTRAVENOUS; SOFT TISSUE at 09:10

## 2023-10-25 RX ADMIN — CEFAZOLIN SODIUM 2 G: 2 SOLUTION INTRAVENOUS at 09:10

## 2023-10-25 RX ADMIN — ACETAMINOPHEN 1000 MG: 500 TABLET, FILM COATED ORAL at 02:10

## 2023-10-25 RX ADMIN — SODIUM CHLORIDE, POTASSIUM CHLORIDE, SODIUM LACTATE AND CALCIUM CHLORIDE: 600; 310; 30; 20 INJECTION, SOLUTION INTRAVENOUS at 02:10

## 2023-10-25 RX ADMIN — ENOXAPARIN SODIUM 40 MG: 40 INJECTION SUBCUTANEOUS at 10:10

## 2023-10-25 RX ADMIN — OXYCODONE HYDROCHLORIDE 10 MG: 10 TABLET ORAL at 05:10

## 2023-10-25 RX ADMIN — CEFAZOLIN SODIUM 2 G: 2 SOLUTION INTRAVENOUS at 05:10

## 2023-10-25 RX ADMIN — MIDAZOLAM HYDROCHLORIDE 2 MG: 1 INJECTION, SOLUTION INTRAMUSCULAR; INTRAVENOUS at 09:10

## 2023-10-25 RX ADMIN — EPHEDRINE SULFATE 5 MG: 50 INJECTION INTRAVENOUS at 09:10

## 2023-10-25 RX ADMIN — ACETAMINOPHEN 1000 MG: 500 TABLET, FILM COATED ORAL at 10:10

## 2023-10-25 RX ADMIN — LIDOCAINE HYDROCHLORIDE 60 MG: 20 INJECTION, SOLUTION EPIDURAL; INFILTRATION; INTRACAUDAL; PERINEURAL at 09:10

## 2023-10-25 RX ADMIN — METHOCARBAMOL 500 MG: 500 TABLET ORAL at 02:10

## 2023-10-25 RX ADMIN — SODIUM CHLORIDE, POTASSIUM CHLORIDE, SODIUM LACTATE AND CALCIUM CHLORIDE: 600; 310; 30; 20 INJECTION, SOLUTION INTRAVENOUS at 05:10

## 2023-10-25 RX ADMIN — EPHEDRINE SULFATE 10 MG: 50 INJECTION INTRAVENOUS at 09:10

## 2023-10-25 RX ADMIN — FENTANYL CITRATE 100 MCG: 50 INJECTION, SOLUTION INTRAMUSCULAR; INTRAVENOUS at 08:10

## 2023-10-25 RX ADMIN — ROCURONIUM BROMIDE 50 MG: 10 SOLUTION INTRAVENOUS at 09:10

## 2023-10-25 RX ADMIN — GABAPENTIN 300 MG: 300 CAPSULE ORAL at 10:10

## 2023-10-25 RX ADMIN — SODIUM CHLORIDE, SODIUM GLUCONATE, SODIUM ACETATE, POTASSIUM CHLORIDE AND MAGNESIUM CHLORIDE: 526; 502; 368; 37; 30 INJECTION, SOLUTION INTRAVENOUS at 08:10

## 2023-10-25 RX ADMIN — FENTANYL CITRATE 50 MCG: 50 INJECTION, SOLUTION INTRAMUSCULAR; INTRAVENOUS at 10:10

## 2023-10-25 RX ADMIN — ACETAMINOPHEN 1000 MG: 10 INJECTION, SOLUTION INTRAVENOUS at 09:10

## 2023-10-25 RX ADMIN — SUGAMMADEX 200 MG: 100 INJECTION, SOLUTION INTRAVENOUS at 10:10

## 2023-10-25 RX ADMIN — METHOCARBAMOL 500 MG: 500 TABLET ORAL at 10:10

## 2023-10-25 RX ADMIN — KETOROLAC TROMETHAMINE 15 MG: 30 INJECTION, SOLUTION INTRAMUSCULAR; INTRAVENOUS at 10:10

## 2023-10-25 RX ADMIN — GLYCOPYRROLATE 0.2 MG: 0.2 INJECTION INTRAMUSCULAR; INTRAVENOUS at 09:10

## 2023-10-25 NOTE — ANESTHESIA PREPROCEDURE EVALUATION
10/25/2023  Artemio Ramsey is a 67 y.o., male.    Other Medical History   Hypertension      Surgical History  No surgical history recorded     Na 137, K 4.3, Cr 0.77  12 / 36 / 185k  Hx of htn / hld ; no other significant medical history    Tib/fib fracture after tripping over [place]    Pre-op Assessment    I have reviewed the Patient Summary Reports.     I have reviewed the Nursing Notes. I have reviewed the NPO Status.   I have reviewed the Medications.     Review of Systems  Anesthesia Hx:   Denies Personal Hx of Anesthesia complications.   Cardiovascular:   Hypertension hyperlipidemia    Hepatic/GI:  Hepatic/GI Normal    Endocrine:  Endocrine Normal        Physical Exam  General: Well nourished, Cooperative and Alert    Airway:  Mallampati: II   Mouth Opening: Normal  TM Distance: Normal  Tongue: Normal    Dental:    Chest/Lungs:  Normal Respiratory Rate        Anesthesia Plan  Type of Anesthesia, risks & benefits discussed:    Anesthesia Type: Gen ETT, Gen Supraglottic Airway  Intra-op Monitoring Plan: Standard ASA Monitors  Post Op Pain Control Plan: multimodal analgesia  Induction:  IV  Informed Consent: Informed consent signed with the Patient and all parties understand the risks and agree with anesthesia plan.  All questions answered.   ASA Score: 2  Day of Surgery Review of History & Physical: H&P Update referred to the surgeon/provider.    Ready For Surgery From Anesthesia Perspective.     .

## 2023-10-25 NOTE — ANESTHESIA PROCEDURE NOTES
Intubation    Date/Time: 10/25/2023 9:10 AM    Performed by: Giovanni Garcia CRNA  Authorized by: Rusty Mcmillan MD    Intubation:     Induction:  Inhalational - mask    Intubated:  Postinduction    Mask Ventilation:  Easy mask    Attempts:  2    Attempted By:  CRNA    Method of Intubation:  Direct    Blade:  Eligio 3    Laryngeal View Grade: Grade III - only epiglottis visible      Attempted By (2nd Attempt):  CRNA    Method of Intubation (2nd Attempt):  Video laryngoscopy    Blade (2nd Attempt):  Martinez 3    Laryngeal View Grade (2nd Attempt): Grade I - full view of cords      Difficult Airway Encountered?: No      Airway Device:  Oral endotracheal tube    Airway Device Size:  8.0    Style/Cuff Inflation:  Cuffed (inflated to minimal occlusive pressure)    Inflation Amount (mL):  8    Tube secured:  23    Secured at:  The lips    Placement Verified By:  Capnometry    Complicating Factors:  Poor neck/head extension    Findings Post-Intubation:  BS equal bilateral and atraumatic/condition of teeth unchanged

## 2023-10-25 NOTE — TRANSFER OF CARE
"Anesthesia Transfer of Care Note    Patient: Artemio Ramsey    Procedure(s) Performed: Procedure(s) (LRB):  INSERTION, INTRAMEDULLARY LOPEZ, TIBIA (Left)    Patient location: PACU    Anesthesia Type: general    Transport from OR: Transported from OR on room air with adequate spontaneous ventilation    Post pain: adequate analgesia    Post assessment: no apparent anesthetic complications    Post vital signs: stable    Level of consciousness: awake, responds to stimulation and alert    Nausea/Vomiting: no nausea/vomiting    Complications: none    Transfer of care protocol was followed      Last vitals:   Visit Vitals  /70   Pulse 65   Temp 36.9 °C (98.4 °F)   Resp 18   Ht 5' 11" (1.803 m)   Wt 69.9 kg (154 lb)   SpO2 (!) 91%   BMI 21.48 kg/m²     "

## 2023-10-25 NOTE — OP NOTE
OPERATIVE REPORT    Patient: Artemio Ramsey   : 1955    MRN: [id number]  Date: 10/25/2023      Surgeon:Jose Ramos DO  Assistant: Pascual Gilbert was essential, part of the procedure including deep hardware placement and deep closure.  No senior assistant was availible  Preoperative Diagnosis:  Left tibia and fibula shaft fracture  Postoperative Diagnosis: Same  Procedure:  1) Treatment of left tibial shaft fracture with intramedullary implant - 83103  Anesthesiologist: Rusty Mcmillan MD  OR Staff: Circulator: Nayeli Mendenhall RN  Scrub Person: Omar Funez; Bhumi Chau ST  Implants:   Implant Name Type Inv. Item Serial No.  Lot No. LRB No. Used Action   LOPEZ REAM BALL TP [id number] - [id number]  LOPEZ REAM BALL TP [id number]  [employer] [id number] Left 1 Implanted and Explanted   DRILL KIT F/BME SPEED, HAND AND WRIST 2.0MM    [email or web address] [id number] Left 1 Implanted and Explanted   STAPLE BONE SPEED [id number] - [id number]  STAPLE BONE SPEED [id number]  [employer] [id number] Left 2 Implanted   NAIL TIB ADV SYS [id number] - [id number]  NAIL TIB ADV SYS [id number]  [employer] [id number] Left 1 Implanted   WIRE GUIDE 3.2MM 400MM - [id number]  WIRE GUIDE 3.2MM 400MM  [id number]  Left 1 Implanted and Explanted   SCREW BONE LOCK IM NAIL 34X5MM - [id number]  SCREW BONE LOCK IM NAIL 34X5MM  [employer] [id number] Left 1 Implanted   SCREW XL25 IM NAIL 36X5MM - [id number]  SCREW XL25 IM NAIL 36X5MM  [employer] [id number] Left 1 Implanted   SCREW DEBBY LP 5.0X34MM - [id number]  SCREW DEBBY LP 5.0X34MM  [employer] [id number] Left 1 Implanted   SCREW XL25 IM 38X5MM - [id number]  SCREW XL25 IM 38X5MM  [employer] [id number] Left 1 Implanted   SCREW XL25 IM 36X5MM - [id number]  SCREW XL25 IM 36X5MM  [employer] [id number] Left 1 Implanted     EBL: 100cc  Complications: None  Disposition: To PACU, stable    Indications: Artemio Ramsey is a 67 y.o. male presenting with the aforementioned injuries. The  procedure is indicated to best obtain and maintain stability of the leg while allowing early ROM.  The patient is awake and alert. After thorough discussion of the risks, benefits, expected outcomes, and alternatives to surgical intervention, the patient agreed to proceed with surgical treatment.  Specific risks discussed included, but were not limited to: superficial or deep infection, wound healing complications, DVT/PE, significant bleeding requiring transfusion, damage to named anatomic structures in the immediate area including named neurovascular structures, implant failure, and general risks of anesthesia.  The patient voiced understanding and written as well as verbal consent was obtained by myself prior to the procedure.    Procedure in Detail:  The patient's extremity was marked by me in the preoperative area.  He was taken to the operating room and after satisfactory induction of anesthesia, was positioned supine on a radiolucent table, with a soft bump under the ipsilateral hip. The lower extremity was sterilely prepped and draped in the usual fashion.  Standard time out procedure was performed confirming the correct surgeon, site, side, patient ID and procedure.        Attention is drawn to the left leg patient has a butterfly fragment I made a 4 cm incision over this butterfly fragment open reduced it with clamps and placed staples across to hold the fracture in place through nailing.    A small longitudinal incision was made superior to the patella. Bovie was used for hemostasis.  The quadriceps tendon was split in-line with its fibers. Soft tissues and the patella were protected, and a guide wire was placed in the appropriate starting position.  Once this position was confirmed with  C-arm  in multiple planes, the wire was advanced into the proximal tibia. The starting reamer was then used through protected soft tissues to create the entry hole over the guide wire.  Next, a long beaded-tip reaming  wire was placed into the intramedullary canal.  C-arm images in multiple planes confirmed successful crossing of the fracture site and intramedullary location of this wire in the distal segment. Intramedullary length was measured, and the intramedullary canal was sequentially reamed to a final reamer size of 1 mm larger than the final nail. Care was taken to maintain fracture reduction during the reaming process.      A Synthes tibia nail was assembled to the jig, and inserted into the tibia over the guide wire.  C-arm imaging confirmed full insertion of the nail, with no prominence.  Fracture reduction and alignment was well maintained.  This fracture is comminuted.  Therefore,  2 interlocking screws were placed proximally using the jig, and 2 interlocking screws were placed distally using free-hand perfect [place] technique.  All interlocking screws were place through small incisions, blunt dissection, and with neuro-vascular structures protected.     At this time, the ankle was evaluated fluoroscopically for instability.  With a mortise view held, an external rotation moment was placed on the ankle. There was no obvious widening of the syndesmosis or of the medial clear space.    The incisions were then irrigated using copious sterile saline and then closed in layered fashion.  The surgical sites were sterilely cleansed and dressed.    The patient was then subsequently extubated and transferred to to PACU in a stable condition.    All sponge and needle counts were correct at the end of the case.  I was present and participated in all aspects of the procedure.    Prognosis:  The patient will be kept WBAT on the ipsilateral extremity.  DVT prophylaxis is indicated for this patient and procedure.  The patient is at risk of pain and stiffness and will be allowed immediate AROM of the knee and ankle       This note/OR report was created with the assistance of  voice recognition software or phone  dictation.  There may  be transcription errors as a result of using this technology however minimal. Effort has been made to assure accuracy of transcription but any obvious errors or omissions should be clarified with the author of the document.       Jose Ramos,   Orthopedic Trauma Surgery

## 2023-10-25 NOTE — NURSING
Nurses Note -- 4 Eyes      10/25/2023   3:37 AM      Skin assessed during: Admit      [x] No Altered Skin Integrity Present    []Prevention Measures Documented      [] Yes- Altered Skin Integrity Present or Discovered   [] LDA Added if Not in Epic (Describe Wound)   [] New Altered Skin Integrity was Present on Admit and Documented in LDA   [] Wound Image Taken    Wound Care Consulted? No    Attending Nurse:  Eunice Travis RN/Staff Member:   kb chase

## 2023-10-25 NOTE — PLAN OF CARE
10/25/23 1543   Discharge Assessment   Assessment Type Discharge Planning Assessment   Confirmed/corrected address, phone number and insurance Yes   Confirmed Demographics Correct on Facesheet  (Patient states his physcial address is [address]   When was your last doctors appointment?   (Pt states his last appointment was over a year ago and he does not know the doctor's name)   Reason For Admission left tibia/fibula fracture   People in Home significant other   Do you expect to return to your current living situation? Yes   Do you have help at home or someone to help you manage your care at home? No   Current cognitive status: Alert/Oriented   Walking or Climbing Stairs ambulation difficulty, requires equipment   Mobility Management Pt states he has a walking stick   Equipment Currently Used at Home   (walking stick)   Do you currently have service(s) that help you manage your care at home? No   Do you take prescription medications? No   Who is going to help you get home at discharge? Pt states his brother Michael Ramsey [phone] or his son will drive him home   How do you get to doctors appointments? family or friend will provide   Are you on dialysis? No   Do you take coumadin? No   DME Needed Upon Discharge    (TBD)   Discharge Plan discussed with: Patient   Transition of Care Barriers None   Discharge Plan A Home with family   Discharge Plan B Rehab     Pt states he lives in a mobile home with 4 steps to enter with railings present. He states he lives with his significant other Janet. He states he is independent with ADL's prior to admission. He states his significant other will go to store for groceries with her niece. He states Janet can hardly walk. He does not currently have a PCP. He is not active with a home health agency. Will follow for discharge needs.

## 2023-10-25 NOTE — CONSULTS
[facility] - 8th Floor Med Surg  Orthopedic Trauma  Consult Note    Patient Name: Artemio Ramsey  MRN: [id number]  Admission Date: 10/24/2023  Hospital Length of Stay: 1 days  Attending Provider: Eduin Mendoza MD  Primary Care Provider: Cherelle, Primary Doctor        Inpatient consult to Orthopedic Surgery  Consult performed by: Jose Ramos DO  Consult ordered by: Lico Swan III, MD        Subjective:         Chief Complaint:   Chief Complaint   Patient presents with    Leg Pain     Pt reports falling through wood decking while building a wheelchair ramp, leg was caught, causing injury to LLE with deformity. Pt splinted by EMS [employer], CMS intact, pulse +2.         HPI:  Patient had a fall onto the left leg suffered a left tibial shaft fracture.  Dull achy pain in this area worse with range of motion better rest no numbness or tingling.  Currently awake and appears to be comfortable.  Patient does not use nicotine products    Past Medical History:   Diagnosis Date    Hypertension        History reviewed. No pertinent surgical history.    Review of patient's allergies indicates:  No Known Allergies    Current Facility-Administered Medications   Medication    acetaminophen tablet 650 mg    docusate sodium capsule 100 mg    enoxaparin injection 40 mg    gabapentin capsule 300 mg    [START ON 10/26/2023] influenza 65up-adj (QUADRIVALENT ADJUVANTED PF) vaccine 0.5 mL    lactated ringers infusion    magnesium hydroxide 400 mg/5 ml suspension 2,400 mg    melatonin tablet 6 mg    methocarbamoL tablet 500 mg    oxyCODONE immediate release tablet 5 mg    oxyCODONE immediate release tablet Tab 10 mg    [START ON 10/26/2023] pneumoc 20-evelio conj-dip cr(PF) (PREVNAR-20 (PF)) injection Syrg 0.5 mL    polyethylene glycol packet 17 g     Family History    None       Tobacco Use    Smoking status: Not on file    Smokeless tobacco: Not on file   Substance and Sexual Activity    Alcohol use: Yes    Drug use: Not on file  "   Sexual activity: Not on file       ROS:  Constitutional: Denies fever chills  Eyes: No change in vision  ENT: No ringing or current infections  CV: No chest pain  Resp: No labored breathing  MSK: Pain evident at site of injury located in HPI,   Integ: No signs of abrasions or lacerations  Neuro: No numbness or tingling  Lymphatic: No swelling outside the area of injury   Objective:     Vital Signs (Most Recent):  Temp: 98.2 °F (36.8 °C) (10/25/23 0203)  Pulse: 66 (10/25/23 0203)  Resp: 18 (10/25/23 0105)  BP: 130/74 (10/25/23 0203)  SpO2: (!) 92 % (10/25/23 0203) Vital Signs (24h Range):  Temp:  [97.6 °F (36.4 °C)-98.2 °F (36.8 °C)] 98.2 °F (36.8 °C)  Pulse:  [] 66  Resp:  [14-20] 18  SpO2:  [92 %-100 %] 92 %  BP: ()/(48-78) 130/74     Weight: 69.9 kg (154 lb)  Height: 5' 11" (180.3 cm)  Body mass index is 21.48 kg/m².      Intake/Output Summary (Last 24 hours) at 10/25/2023 0657  Last data filed at 10/25/2023 0500  Gross per 24 hour   Intake --   Output 275 ml   Net -275 ml       Ortho/SPM Exam  General the patient is alert and oriented x3 no acute distress nontoxic-appearing appropriate affect.    Constitutional: Vital signs are examined and stable.  Resp: No signs of labored breathing                 LLE: -Skin:  No signs of new abrasions or lacerations, no scars           -MSK: EHL/FHL,  Strength 5/5           -Neuro:  Sensation intact to light touch L3-S1 dermatomes           -Lymphatic: No signs of lymphadenopathy           -CV: Capillary refill is less than 2 seconds. DP/PT pulses 2/4. Compartments soft and compressible                          Significant Labs:   Recent Lab Results  (Last 5 results in the past 24 hours)        10/25/23  0242   10/25/23  0013   10/24/23  1601   10/24/23  1529   10/24/23  1457        Albumin/Globulin Ratio 1.2       1.3         ABO and RH     O POS           Albumin 3.3       3.4         Alcohol, Serum       <10.0         ALP 60       58         ALT 31       " 25         Appearance, UA   Clear             AST 42       24         Bacteria, UA   None Seen             Baso # 0.02         0.04       Basophil % 0.4         0.8       BILIRUBIN TOTAL 1.4       0.7         Bilirubin, UA   Negative             BUN 16.1       20.1         Calcium 8.5       8.5         Chloride 106       109         CO2 24       23         Color, UA   Yellow             Creatinine 0.77       0.85         eGFR >60       >60         Eos # 0.07         0.06       Eosinophil % 1.2         1.2       Globulin, Total 2.8       2.7         Glucose 89       82         Glucose, UA   Negative             Group & Rh         O POS       Hematocrit 36.3         38.4       Hemoglobin 12.2         13.0       Immature Grans (Abs) 0.01         0.01       Immature Granulocytes 0.2         0.2       Indirect Lyudmila GEL         NEG       INR         1.0       Ketones, UA   Trace             Leukocytes, UA   Negative             Lymph # 1.49         1.20       LYMPH % 26.4         23.4       Magnesium  1.50               MCH 28.8         28.3       MCHC 33.6         33.9       MCV 85.8         83.7       Mono # 0.58         0.46       Mono % 10.3         9.0       MPV 9.5         10.2       Neut # 3.47         3.36       Neut % 61.5         65.4       NITRITE UA   Negative             nRBC 0.0         0.0       Occult Blood UA   Negative             pH, UA   5.0             Phosphorus Level 3.1               Platelet Count 185         218       Potassium 4.3       3.9         PROTEIN TOTAL 6.1       6.1         Protein, UA   Negative             Protime         13.4       RBC 4.23         4.59       RBC, UA   <5             RDW 16.1         16.0       Sodium 137       140         Specific Gravity,UA   1.022             Specimen Outdate         10/27/2023 23:59       Squam Epithel, UA   <5             Urobilinogen, UA   0.2             WBC, UA   <5             WBC 5.64         5.13                            All pertinent  labs within the past 24 hours have been reviewed.  Recent Lab Results  (Last 5 results in the past 72 hours)        10/25/23  0242   10/25/23  0013   10/24/23  1601   10/24/23  1529   10/24/23  1457        Albumin/Globulin Ratio 1.2       1.3         ABO and RH     O POS           Albumin 3.3       3.4         Alcohol, Serum       <10.0         ALP 60       58         ALT 31       25         Appearance, UA   Clear             AST 42       24         Bacteria, UA   None Seen             Baso # 0.02         0.04       Basophil % 0.4         0.8       BILIRUBIN TOTAL 1.4       0.7         Bilirubin, UA   Negative             BUN 16.1       20.1         Calcium 8.5       8.5         Chloride 106       109         CO2 24       23         Color, UA   Yellow             Creatinine 0.77       0.85         eGFR >60       >60         Eos # 0.07         0.06       Eosinophil % 1.2         1.2       Globulin, Total 2.8       2.7         Glucose 89       82         Glucose, UA   Negative             Group & Rh         O POS       Hematocrit 36.3         38.4       Hemoglobin 12.2         13.0       Immature Grans (Abs) 0.01         0.01       Immature Granulocytes 0.2         0.2       Indirect Lyudmila GEL         NEG       INR         1.0       Ketones, UA   Trace             Leukocytes, UA   Negative             Lymph # 1.49         1.20       LYMPH % 26.4         23.4       Magnesium  1.50               MCH 28.8         28.3       MCHC 33.6         33.9       MCV 85.8         83.7       Mono # 0.58         0.46       Mono % 10.3         9.0       MPV 9.5         10.2       Neut # 3.47         3.36       Neut % 61.5         65.4       NITRITE UA   Negative             nRBC 0.0         0.0       Occult Blood UA   Negative             pH, UA   5.0             Phosphorus Level 3.1               Platelet Count 185         218       Potassium 4.3       3.9         PROTEIN TOTAL 6.1       6.1         Protein, UA   Negative              Protime         13.4       RBC 4.23         4.59       RBC, UA   <5             RDW 16.1         16.0       Sodium 137       140         Specific Gravity,UA   1.022             Specimen Outdate         10/27/2023 23:59       Squam Epithel, UA   <5             Urobilinogen, UA   0.2             WBC, UA   <5             WBC 5.64         5.13                               Significant Imaging: I have reviewed all pertinent imaging results/findings.  X-Ray Tibia Fibula 2 View Left    Result Date: 10/24/2023  EXAMINATION: XR TIBIA FIBULA 2 VIEW LEFT CLINICAL HISTORY: Injury, unspecified, initial encounter COMPARISON: X-rays dated 10/24/2023 FINDINGS: There is improved alignment of the tibial and fibular diaphyseal fractures following reduction.  Splinting material is in place.     Improved alignment of the tibial and fibular fractures following reduction. Electronically signed by: Shayy Carmichael Date:    10/24/2023 Time:    15:37    X-Ray Chest 1 View    Result Date: 10/24/2023  EXAMINATION: XR CHEST 1 VIEW CLINICAL HISTORY: weakness; TECHNIQUE: AP chest COMPARISON: None. FINDINGS: The heart is normal in size.  There is left basilar subsegmental axis.  There is otherwise no focal airspace consolidation.  There is no pleural effusion or visible pneumothorax.     Left basilar subsegmental atelectasis. Electronically signed by: Shayy Carmichael Date:    10/24/2023 Time:    15:36    X-Ray Ankle Complete Left    Result Date: 10/24/2023  EXAMINATION: XR ANKLE COMPLETE 3 VIEW LEFT CLINICAL HISTORY: Injury, unspecified, initial encounter COMPARISON: None. FINDINGS: Splinting material obscures detail.  There is no acute fracture or malalignment in the ankle.  There are fractures of the mid tibia and fibular diaphyses.     No acute abnormality identified in the ankle. Electronically signed by: Shayy Carmichael Date:    10/24/2023 Time:    15:11    X-Ray Tibia Fibula 2 View Left    Result Date: 10/24/2023  EXAMINATION: XR TIBIA  FIBULA 2 VIEW LEFT CLINICAL HISTORY: Injury, unspecified, initial encounter COMPARISON: None. FINDINGS: Comminuted displace fractures of the proximal to midshaft of both the tibia and fibula with over riding of fracture fragments Joint spaces preserved. No blastic or lytic lesions. Soft tissues within normal limits.     Fractures as above. Electronically signed by: Duane Muller Date:    10/24/2023 Time:    15:00    X-Ray Knee Complete 4 or More Views Left    Result Date: 10/24/2023  EXAMINATION: XR KNEE COMP 4 OR MORE VIEWS LEFT CLINICAL HISTORY: Injury, unspecified, initial encounter COMPARISON: None. FINDINGS: Markedly displaced comminuted fracture involving the proximal to mid 3rd of the tibia and the proximal 3rd of the fibula with significant displacement and over riding of fracture fragments There is some narrowing of the medial compartment of the knee joint No blastic or lytic lesions. Soft tissues within normal limits.     Fractures as above. Electronically signed by: Duane Muller Date:    10/24/2023 Time:    14:56       Assessment/Plan:     Active Diagnoses:    Diagnosis Date Noted POA    PRINCIPAL PROBLEM:  Tibia/fibula fracture, left, closed, initial encounter [S82.202A, S82.402A] 10/24/2023 Yes      Problems Resolved During this Admission:         Patient has a left tibial shaft fracture meeting surgical indications for stabilization.  Patient understands risks and benefits of the procedure.  He is placed on the OR schedule for today.  Preoperative EKG completed.    I explained that surgery and the nature of their condition are not without risks. These include, but are not limited to, bleeding, infection, neurovascular compromise, malunion, nonunion, hardware complications, wound complications, scarring, cosmetic defects, need for later and/or repeated surgeries, avascular necrosis, bone death due to initial trauma, pain, loss of ROM, loss of function, PTOA, deformity, stance/gait and/or  functional abnormalities, thromboembolic complications, compartment syndrome, loss of limb, loss of life, anesthetic complications, and other imponderables. I explained that these can occur despite the adequacy of treatments rendered, and that their risks are heightened given the nature of their condition. They verbalized understanding. They would like to continue with surgery at this time. If appropriate family was involved with surgical discussion.             This note/OR report was created with the assistance of  voice recognition software or phone  dictation.  There may be transcription errors as a result of using this technology however minimal. Effort has been made to assure accuracy of transcription but any obvious errors or omissions should be clarified with the author of the document.       Jose Ramos, DO   Orthopedic Trauma Surgery  [facility] - 8th Floor Med Surg

## 2023-10-25 NOTE — PROGRESS NOTES
"   Trauma Surgery   Progress Note  Admit Date: 10/24/2023  HD#1  POD#* No surgery date entered *    Subjective:   Interval history:  NAEON  AFVSS  Patient is NPO   Plan for OR today with ortho for L tib fib  Patient only endorses pain to the LLE    Home Meds: No current outpatient medications   Scheduled Meds:   acetaminophen  650 mg Oral Q4H    docusate sodium  100 mg Oral BID    enoxparin  40 mg Subcutaneous Q12H    gabapentin  300 mg Oral TID    methocarbamoL  500 mg Oral Q8H    polyethylene glycol  17 g Oral BID     Continuous Infusions:   lactated ringers 100 mL/hr at 10/25/23 0229     PRN Meds:[START ON 10/26/2023] influenza 65up-adj, magnesium hydroxide 400 mg/5 ml, melatonin, oxyCODONE, oxyCODONE, [START ON 10/26/2023] pneumoc 20-evelio conj-dip cr(PF)     Objective:     VITAL SIGNS: 24 HR MIN & MAX LAST   Temp  Min: 97.6 °F (36.4 °C)  Max: 98.2 °F (36.8 °C)  98.2 °F (36.8 °C)   BP  Min: 81/48  Max: 131/76  130/74    Pulse  Min: 59  Max: 107  66    Resp  Min: 14  Max: 20  18    SpO2  Min: 92 %  Max: 100 %  (!) 92 %      HT: 5' 11" (180.3 cm)  WT: 69.9 kg (154 lb)  BMI: 21.5     Intake/output:  Intake/Output - Last 3 Shifts         10/23 0700  10/24 0659 10/24 0700  10/25 0659    Urine (mL/kg/hr)  275    Total Output  275    Net  -275                  Intake/Output Summary (Last 24 hours) at 10/25/2023 0655  Last data filed at 10/25/2023 0500  Gross per 24 hour   Intake --   Output 275 ml   Net -275 ml         Lines/drains/airway:       Peripheral IV - Single Lumen 10/24/23 1420 20 G Anterior;Distal;Left Forearm (Active)   Site Assessment Clean;Dry;Intact 10/25/23 0400   Line Status Infusing 10/25/23 0400   Dressing Status Clean;Dry;Intact 10/25/23 0400   Number of days: 0       Physical examination:  Gen: NAD, AAOx3, answering questions appropriately  HEENT: NCAT  CV: RR  Resp: NWOB  Abd: S/NT/ND  Msk: moving all extremities spontaneously and purposefully. LLE is placed in a splint  Neuro: CN II-XII grossly " "intact      Labs:  Renal:  Recent Labs     10/24/23  1529 10/25/23  0242   BUN 20.1 16.1   CREATININE 0.85 0.77     No results for input(s): "LACTIC" in the last 72 hours.  FEN/GI:  Recent Labs     10/24/23  1529 10/25/23  0242    137   K 3.9 4.3   CO2 23 24   CALCIUM 8.5* 8.5*   MG  --  1.50*   PHOS  --  3.1   ALBUMIN 3.4 3.3*   BILITOT 0.7 1.4   AST 24 42*   ALKPHOS 58 60   ALT 25 31     Heme:  Recent Labs     10/24/23  1457 10/25/23  0242   HGB 13.0* 12.2*   HCT 38.4* 36.3*    185   INR 1.0  --      ID:  Recent Labs     10/24/23  1457 10/25/23  0242   WBC 5.13 5.64     CBG:  Recent Labs     10/24/23  1529 10/25/23  0242   GLUCOSE 82 89      No results for input(s): "POCTGLUCOSE" in the last 72 hours.   Cardiovascular:  No results for input(s): "TROPONINI", "CKTOTAL", "CKMB", "BNP" in the last 168 hours.  I have reviewed all pertinent lab results within the past 24 hours.    Imaging:  X-Ray Tibia Fibula 2 View Left   Final Result      Improved alignment of the tibial and fibular fractures following reduction.         Electronically signed by: Shayy Carmichael   Date:    10/24/2023   Time:    15:37      X-Ray Chest 1 View   Final Result      Left basilar subsegmental atelectasis.         Electronically signed by: Shayy Carmichael   Date:    10/24/2023   Time:    15:36      X-Ray Tibia Fibula 2 View Left   Final Result      Fractures as above.         Electronically signed by: Duane Muller   Date:    10/24/2023   Time:    15:00      X-Ray Knee Complete 4 or More Views Left   Final Result      Fractures as above.         Electronically signed by: Duane Muller   Date:    10/24/2023   Time:    14:56      X-Ray Ankle Complete Left   Final Result      No acute abnormality identified in the ankle.         Electronically signed by: Shayy Carmichael   Date:    10/24/2023   Time:    15:11         I have reviewed all pertinent imaging results/findings within the past 24 " hours.    Micro/Path/Other:  Microbiology Results (last 7 days)       ** No results found for the last 168 hours. **           Specimen (168h ago, onward)      None             Problems list:  Active Problem List with Overview Notes    Diagnosis Date Noted    Tibia/fibula fracture, left, closed, initial encounter 10/24/2023        Assessment & Plan:   Patient is a 66 yo M with PMHx of HTN who presented as a fall sustaining a left tib-fib fracture. Plan for OR today with orthopedic surgery.   Consults:   Orthopedic surgery   Therapy:  Physical Therapy when able  Occupational Therapy when able Weight bearing status:   RUE: WBAT  LUE: WBAT  RLE: WBAT  LLE: NWB Precautions:  Fall and Standard   Seizure prophylaxis:  Not indicated. VTE prophylaxis:     Prophylactic Lovenox 40mg BID  GI prophylaxis:  Not indicated. Tolerating ordered diet.   Outpatient follow up:  Orthopedic surgery Disposition:  as per patient progress     - NPO  - Daily labs  - MM pain control  - IS  - Therapy as above  - VTE prevention as above    Zhen Kunz MD  General Surgery

## 2023-10-26 LAB
ALBUMIN SERPL-MCNC: 3.1 G/DL (ref 3.4–4.8)
ALBUMIN/GLOB SERPL: 1 RATIO (ref 1.1–2)
ALP SERPL-CCNC: 58 UNIT/L (ref 40–150)
ALT SERPL-CCNC: 25 UNIT/L (ref 0–55)
AST SERPL-CCNC: 35 UNIT/L (ref 5–34)
BASOPHILS # BLD AUTO: 0.01 X10(3)/MCL
BASOPHILS NFR BLD AUTO: 0.1 %
BILIRUB SERPL-MCNC: 1.1 MG/DL
BUN SERPL-MCNC: 11.4 MG/DL (ref 8.4–25.7)
CALCIUM SERPL-MCNC: 8.6 MG/DL (ref 8.8–10)
CHLORIDE SERPL-SCNC: 103 MMOL/L (ref 98–107)
CO2 SERPL-SCNC: 26 MMOL/L (ref 23–31)
CREAT SERPL-MCNC: 0.82 MG/DL (ref 0.73–1.18)
CRP SERPL-MCNC: 84.4 MG/L
EOSINOPHIL # BLD AUTO: 0 X10(3)/MCL (ref 0–0.9)
EOSINOPHIL NFR BLD AUTO: 0 %
ERYTHROCYTE [DISTWIDTH] IN BLOOD BY AUTOMATED COUNT: 15.9 % (ref 11.5–17)
GFR SERPLBLD CREATININE-BSD FMLA CKD-EPI: >60 MLS/MIN/1.73/M2
GLOBULIN SER-MCNC: 3.1 GM/DL (ref 2.4–3.5)
GLUCOSE SERPL-MCNC: 133 MG/DL (ref 82–115)
HCT VFR BLD AUTO: 35.1 % (ref 42–52)
HGB BLD-MCNC: 11.9 G/DL (ref 14–18)
IMM GRANULOCYTES # BLD AUTO: 0.04 X10(3)/MCL (ref 0–0.04)
IMM GRANULOCYTES NFR BLD AUTO: 0.5 %
LYMPHOCYTES # BLD AUTO: 0.65 X10(3)/MCL (ref 0.6–4.6)
LYMPHOCYTES NFR BLD AUTO: 8.5 %
MCH RBC QN AUTO: 28.4 PG (ref 27–31)
MCHC RBC AUTO-ENTMCNC: 33.9 G/DL (ref 33–36)
MCV RBC AUTO: 83.8 FL (ref 80–94)
MONOCYTES # BLD AUTO: 0.86 X10(3)/MCL (ref 0.1–1.3)
MONOCYTES NFR BLD AUTO: 11.3 %
NEUTROPHILS # BLD AUTO: 6.06 X10(3)/MCL (ref 2.1–9.2)
NEUTROPHILS NFR BLD AUTO: 79.6 %
NRBC BLD AUTO-RTO: 0 %
PLATELET # BLD AUTO: 201 X10(3)/MCL (ref 130–400)
PMV BLD AUTO: 10.6 FL (ref 7.4–10.4)
POTASSIUM SERPL-SCNC: 4.5 MMOL/L (ref 3.5–5.1)
PREALB SERPL-MCNC: 15.7 MG/DL (ref 16–42)
PROT SERPL-MCNC: 6.2 GM/DL (ref 5.8–7.6)
RBC # BLD AUTO: 4.19 X10(6)/MCL (ref 4.7–6.1)
SODIUM SERPL-SCNC: 138 MMOL/L (ref 136–145)
WBC # SPEC AUTO: 7.62 X10(3)/MCL (ref 4.5–11.5)

## 2023-10-26 PROCEDURE — 25000003 PHARM REV CODE 250

## 2023-10-26 PROCEDURE — 63600175 PHARM REV CODE 636 W HCPCS

## 2023-10-26 PROCEDURE — G0378 HOSPITAL OBSERVATION PER HR: HCPCS

## 2023-10-26 PROCEDURE — 97162 PT EVAL MOD COMPLEX 30 MIN: CPT

## 2023-10-26 PROCEDURE — 96372 THER/PROPH/DIAG INJ SC/IM: CPT

## 2023-10-26 PROCEDURE — 96366 THER/PROPH/DIAG IV INF ADDON: CPT

## 2023-10-26 PROCEDURE — 85025 COMPLETE CBC W/AUTO DIFF WBC: CPT

## 2023-10-26 PROCEDURE — 63600175 PHARM REV CODE 636 W HCPCS: Performed by: ORTHOPAEDIC SURGERY

## 2023-10-26 PROCEDURE — 96361 HYDRATE IV INFUSION ADD-ON: CPT

## 2023-10-26 PROCEDURE — 80053 COMPREHEN METABOLIC PANEL: CPT

## 2023-10-26 PROCEDURE — 97166 OT EVAL MOD COMPLEX 45 MIN: CPT

## 2023-10-26 PROCEDURE — 86140 C-REACTIVE PROTEIN: CPT

## 2023-10-26 PROCEDURE — 84134 ASSAY OF PREALBUMIN: CPT

## 2023-10-26 RX ADMIN — POLYETHYLENE GLYCOL 3350 17 G: 17 POWDER, FOR SOLUTION ORAL at 08:10

## 2023-10-26 RX ADMIN — CEFAZOLIN SODIUM 2 G: 2 SOLUTION INTRAVENOUS at 12:10

## 2023-10-26 RX ADMIN — OXYCODONE HYDROCHLORIDE 10 MG: 10 TABLET ORAL at 07:10

## 2023-10-26 RX ADMIN — DOCUSATE SODIUM 100 MG: 100 CAPSULE, LIQUID FILLED ORAL at 08:10

## 2023-10-26 RX ADMIN — GABAPENTIN 300 MG: 300 CAPSULE ORAL at 08:10

## 2023-10-26 RX ADMIN — GABAPENTIN 300 MG: 300 CAPSULE ORAL at 09:10

## 2023-10-26 RX ADMIN — GABAPENTIN 300 MG: 300 CAPSULE ORAL at 03:10

## 2023-10-26 RX ADMIN — ENOXAPARIN SODIUM 40 MG: 40 INJECTION SUBCUTANEOUS at 09:10

## 2023-10-26 RX ADMIN — DOCUSATE SODIUM 100 MG: 100 CAPSULE, LIQUID FILLED ORAL at 09:10

## 2023-10-26 RX ADMIN — ENOXAPARIN SODIUM 40 MG: 40 INJECTION SUBCUTANEOUS at 08:10

## 2023-10-26 RX ADMIN — METHOCARBAMOL 500 MG: 500 TABLET ORAL at 09:10

## 2023-10-26 RX ADMIN — METHOCARBAMOL 500 MG: 500 TABLET ORAL at 01:10

## 2023-10-26 NOTE — NURSING
Nurses Note -- 4 Eyes      10/26/2023   5:36 PM      Skin assessed during: Admit      [] No Altered Skin Integrity Present    []Prevention Measures Documented      [x] Yes- Altered Skin Integrity Present or Discovered   [x] LDA Added if Not in Epic (Describe Wound)   [x] New Altered Skin Integrity was Present on Admit and Documented in LDA   [x] Wound Image Taken    Wound Care Consulted? No- sx dressing wound    Attending Nurse:  AMARIS Tran    Second RN/Staff Member:   AMARIS Dial

## 2023-10-26 NOTE — PT/OT/SLP EVAL
Occupational Therapy  Evaluation    Name: Artemio Ramsey  MRN: [id number]  Admitting Diagnosis: LLE pain s/p fall   Recent Surgery: Procedure(s) (LRB):  INSERTION, INTRAMEDULLARY LOPEZ, TIBIA (Left) 1 Day Post-Op    Recommendations:     Discharge therapy intensity: High Intensity Therapy   Discharge Equipment Recommendations:  bath bench, walker, rolling  Barriers to discharge:  Other (Comment) (impaired self-care, functional mobility, and pain)    Assessment:     Artemio Ramsey is a 67 y.o. male with a medical diagnosis of L tibia and fibula shaft fx s/p IMN. He is pleasant and agreeable to therapy; required verbal cues for walker positioning and safety. He required SBA for bed mobility and Mod A for functional mobility to toilet. He presents with the following performance deficits affecting function: weakness, impaired functional mobility, decreased safety awareness, impaired endurance, gait instability, pain, impaired self care skills, decreased lower extremity function, orthopedic precautions.     Rehab Prognosis: Good; patient would benefit from acute skilled OT services to address these deficits and reach maximum level of function.       Plan:     Patient to be seen 3 x/week to address the above listed problems via self-care/home management, therapeutic activities, therapeutic exercises  Plan of Care Expires: 11/23/23  Plan of Care Reviewed with: patient    Subjective     Chief Complaint: LLE pain   Patient/Family Comments/goals: To return to PLOF     Occupational Profile:  Living Environment: Pt reports living with his significant other in a mobile home with x4 steps to enter. Pt reports that he has a tub with no bath bench.  Previous level of function: Pt reports being independent with ADLs prior.   Roles and Routines: Brother, father, retired   Equipment Used at Home: cane, quad  Assistance upon Discharge: Pt reports his brother and son are sometimes available to assist, but his brother lives in  [place] and his son lives in [place]. Pt reports his significant other is not able to assist upon d/c.     Pain/Comfort:  Pain Rating 1: 9/10  Location - Side 1: Left  Location 1: leg  Pain Addressed 1: Reposition, Distraction    Patients cultural, spiritual, Adventism conflicts given the current situation: no    Objective:     OT communicated with RN prior to session.      Patient was found HOB elevated with peripheral IV upon OT entry to room.    General Precautions: Standard,    Orthopedic Precautions: LLE weight bearing as tolerated  Braces: N/A    Bed Mobility:    Patient completed Supine to Sit with stand by assistance    Functional Mobility/Transfers:  Patient completed Sit <> Stand Transfer with moderate assistance  with  rolling walker   Patient completed Chair Transfer using Step Transfer technique with moderate assistance with rolling walker  Patient completed Toilet Transfer Step Transfer technique with moderate assistance with  rolling walker and grab bars  Functional Mobility: Pt ambulated with min-mod A using rolling walker from bedside to bathroom to complete toilet transfer.     Activities of Daily Living:  Lower Body Dressing: total assistance to don bilateral socks while sitting EOB. Pt unable to utilize figure four technique. Pt would benefit from dressing equipment.   Toileting: moderate assistance to complete toilet transfer using rolling walker and grab bars.     AMPAC 6 Click ADL:  AMPAC Total Score: 18    Functional Cognition:  Verbal cues for walker safety. Oriented to person, place, and time.     Visual Perceptual Skills:  Intact    Range of Motion/Manual Muscle Test    RUE  ROM Limitations  5 4+ 4 4- 3+ 3 3- 2+ 2 2- 1 0   Shoulder Flexion WFL []   [x]   []   []   []  []  []  []  []  []  []  []    Shoulder Extension WFL []   [x]   []   []   []  []  []  []  []  []  []  []    Elbow Flexion WFL []   [x]   []   []   []  []  []  []  []  []  []  []    Elbow Extension WFL []   [x]   []    []   []  []  []  []  []  []  []  []      LUE  ROM Limitations 5 4+ 4 4- 3+ 3 3- 2+ 2 2- 1 0   Shoulder Flexion WFL []   [x]   []   []   []  []  []  []  []  []  []  []    Shoulder Extension WFL []   [x]   []   []   []  []  []  []  []  []  []  []    Elbow Flexion WFL []   [x]   []   []   []  []  []  []  []  []  []  []    Elbow Extension WFL []   [x]   []   []   []  []  []  []  []  []  []  []        Balance:   Decreased dynamic standing balance.     Therapeutic Positioning  Risk for acquired pressure injuries is increased due to relative decrease in mobility d/t hospitalization .    OT interventions performed during the course of today's session:   Therapeutic positioning was provided at the conclusion of session to offload all bony prominences for the prevention and/or reduction of pressure injuries    Skin assessment: all bony prominences were assessed    Findings: no redness or breakdown noted    OT recommendations for therapeutic positioning throughout hospitalization:   Follow [facility] Pressure Injury Prevention Protocol    Patient Education:  Patient provided with verbal education education regarding OT role/goals/POC, post op precautions, safety awareness, and Discharge/DME recommendations.  Understanding was verbalized, however additional teaching warranted.     Patient left up in chair with all lines intact and call button in reach    GOALS:   Multidisciplinary Problems       Occupational Therapy Goals          Problem: Occupational Therapy    Goal Priority Disciplines Outcome Interventions   Occupational Therapy Goal     OT, PT/OT Ongoing, Progressing    Description: STG: to be met by 11/26/23    Pt will complete grooming standing at sink with LRAD with SBA.  Pt will complete UB dressing independently while seated.  Pt will complete LB dressing with SBA using LRAD and AE prn.   Pt will complete toileting with SBA using LRAD.  Pt will complete functional mobility to/from toilet and toilet transfer with SBA using  LRAD.                        History:     Past Medical History:   Diagnosis Date    Hypertension          Past Surgical History:   Procedure Laterality Date    INSERTION OF INTRAMEDULLARY NAIL INTO TIBIA Left 10/25/2023    Procedure: INSERTION, INTRAMEDULLARY LOPEZ, TIBIA;  Surgeon: Jose Ramos DO;  Location: [facility];  Service: Orthopedics;  Laterality: Left;  supine vascular bed bone foam c arm synthes       Time Tracking:     OT Date of Treatment: 10/26/23  OT Start Time: 0935  OT Stop Time: 1000  OT Total Time (min): 25 min    Billable Minutes:Evaluation Moderate Complexity     10/26/2023

## 2023-10-26 NOTE — PLAN OF CARE
Problem: Occupational Therapy  Goal: Occupational Therapy Goal  Description: STG: to be met by 11/26/23    Pt will complete grooming standing at sink with LRAD with SBA.  Pt will complete UB dressing independently while seated.  Pt will complete LB dressing with SBA using LRAD and AE prn.   Pt will complete toileting with SBA using LRAD.  Pt will complete functional mobility to/from toilet and toilet transfer with SBA using LRAD.   Outcome: Ongoing, Progressing

## 2023-10-26 NOTE — PT/OT/SLP EVAL
Physical Therapy Evaluation    Patient Name:  Artemio Ramsey   MRN:  [id number]    Recommendations:     Discharge therapy intensity: high intensity   Discharge Equipment Recommendations: bath bench, walker, rolling   Barriers to discharge: Impaired mobility    Assessment:     Artemio Ramsey is a 67 y.o. male admitted with a medical diagnosis of left Tibia/fibula fracture. Patient reports living with spouse in mobile home with 4 steps (no rails) to enter. At baseline, he is independent and ambulated with quad cane. He presents with the following impairments/functional limitations: weakness, impaired endurance, impaired self care skills, impaired functional mobility, gait instability, impaired balance, decreased lower extremity function, decreased safety awareness, pain. He was SBA for bed mobility. MIN A for sit<>stand. Ambulated 20 ft with RW & CGA- MIN A. Limited by pain and fatigue. Patient will benefit from continued PT services while in hospital to improve functional mobility & return to PLOF. Recommending high intensity therapy at discharge as his wife is unable to help him at home.     Rehab Prognosis: Good; patient would benefit from acute skilled PT services to address these deficits and reach maximum level of function.    Recent Surgery: Procedure(s) (LRB):  INSERTION, INTRAMEDULLARY LOPEZ, TIBIA (Left) 1 Day Post-Op    Plan:     During this hospitalization, patient to be seen 6 x/week to address the identified rehab impairments via gait training, therapeutic activities, therapeutic exercises, neuromuscular re-education and progress toward the following goals:    Plan of Care Expires:  11/26/23    Subjective     Chief Complaint: pain  Patient/Family Comments/goals: none  Pain/Comfort:  Pain Rating 1: 9/10  Location - Side 1: Left  Location 1: leg  Pain Addressed 1: Reposition, Distraction  Pain Rating Post-Intervention 1: 9/10    Patients cultural, spiritual, Sabianist conflicts given the current situation:  no    Living Environment:  Patient reports living with spouse in mobile home with 4 steps (no rails) to enter.  Prior to admission, patients level of function was independent.  Equipment used at home: cane, quad.  DME owned (not currently used): none.  Upon discharge, patient will have assistance from TBD.    Objective:     Communicated with nurse prior to session.  Patient found supine with peripheral IV  upon PT entry to room.    General Precautions: Standard, fall  Orthopedic Precautions:LLE weight bearing as tolerated (Full ROM at knee and ankle)   Braces: N/A  Respiratory Status: Room air    Exams:  Cognitive Exam:  Patient is oriented to Person, Place, Time, and Situation  Sensation: -       Intact  RLE ROM: WFL  RLE Strength: WFL  LLE ROM: Unable to accurately assess 2/2 pain  LLE Strength: Unable to accurately assess 2/2 pain   Skin integrity: Visible skin intact      Functional Mobility:  Bed Mobility:     Supine to Sit: stand by assistance  Transfers:  Sit to Stand:  minimum assistance with rolling walker  Gait: 20 ft with RW & CGA-MIN A. Limited by pain and fatigue  Balance: fair      AM-PAC 6 CLICK MOBILITY  Total Score:19     Education Provided:  Role and goals of PT, transfer training, bed mobility, gait training, balance training, safety awareness, assistive device, strengthening exercises, and importance of participating in PT to return to PLOF.    Patient left up in chair with all lines intact and call button in reach. Educated on calling for assistance when ready to return to bed    GOALS:   Multidisciplinary Problems       Physical Therapy Goals          Problem: Physical Therapy    Goal Priority Disciplines Outcome Goal Variances Interventions   Physical Therapy Goal     PT, PT/OT Ongoing, Progressing     Description: Goals to be met by: 23     Patient will increase functional independence with mobility by performin. Supine to sit with [place]  2. Sit to supine with  [place]  3. Sit to stand transfer with Modified [place]  4. Gait  x 200 feet with Modified [place] using Rolling Walker.   5. Ascend/descend 4 stairs with no Handrails & Stand-by Assistance                         History:     Past Medical History:   Diagnosis Date    Hypertension        Past Surgical History:   Procedure Laterality Date    INSERTION OF INTRAMEDULLARY NAIL INTO TIBIA Left 10/25/2023    Procedure: INSERTION, INTRAMEDULLARY LOPEZ, TIBIA;  Surgeon: Jose Ramos DO;  Location: [facility];  Service: Orthopedics;  Laterality: Left;  supine vascular bed bone foam c arm synthes       Time Tracking:     PT Received On: 10/26/23  PT Start Time: 0944     PT Stop Time: 0959  PT Total Time (min): 15 min     Billable Minutes: Evaluation 15 minutes      10/26/2023

## 2023-10-26 NOTE — PLAN OF CARE
Problem: Physical Therapy  Goal: Physical Therapy Goal  Description: Goals to be met by: 23     Patient will increase functional independence with mobility by performin. Supine to sit with [place]  2. Sit to supine with [place]  3. Sit to stand transfer with Modified [place]  4. Gait  x 200 feet with Modified [place] using Rolling Walker.   5. Ascend/descend 4 stairs with no Handrails & Stand-by Assistance    Outcome: Ongoing, Progressing

## 2023-10-26 NOTE — PLAN OF CARE
[facility] had patient sign MOON form. A copy was given to him and the original was placed in patient's chart.

## 2023-10-26 NOTE — TERTIARY TRAUMA SURVEY NOTE
TERTIARY TRAUMA SURVEY (TTS)    List Injuries Identified to Date:   1. Comminuted displaced fracture of the proximal tibia and fibula    [x]Problems list reviewed  List Operations and Procedures:   1. IMN of Left tib-fib 10/25/23    History reviewed. No pertinent surgical history.    Incidental findings:   1. none    Past Medical History:   1. As stated below    Active Ambulatory Problems     Diagnosis Date Noted    No Active Ambulatory Problems     Resolved Ambulatory Problems     Diagnosis Date Noted    No Resolved Ambulatory Problems     Past Medical History:   Diagnosis Date    Hypertension      Past Medical History:   Diagnosis Date    Hypertension        Tertiary Physical Exam:     Physical Exam  Constitutional:       Appearance: Normal appearance. He is normal weight.   HENT:      Head: Normocephalic and atraumatic.   Eyes:      Extraocular Movements: Extraocular movements intact.      Conjunctiva/sclera: Conjunctivae normal.      Pupils: Pupils are equal, round, and reactive to light.   Cardiovascular:      Rate and Rhythm: Normal rate and regular rhythm.      Pulses: Normal pulses.   Pulmonary:      Effort: Pulmonary effort is normal.   Abdominal:      General: Abdomen is flat.      Palpations: Abdomen is soft.   Musculoskeletal:         General: Normal range of motion.      Cervical back: Normal range of motion.      Comments: Left IMN tib fib surgical dressings in place c/d/I. Patient is able to move his left toes and are warm touch.    Skin:     General: Skin is warm.      Capillary Refill: Capillary refill takes less than 2 seconds.   Neurological:      General: No focal deficit present.      Mental Status: He is alert and oriented to person, place, and time.   Psychiatric:         Mood and Affect: Mood normal.         Imaging Review:     Imaging Results              X-Ray Tibia Fibula 2 View Left (Final result)  Result time 10/24/23 15:37:23      Final result by Shayy Carmichael MD (10/24/23  15:37:23)                   Impression:      Improved alignment of the tibial and fibular fractures following reduction.      Electronically signed by: Shayy Carmichael  Date:    10/24/2023  Time:    15:37               Narrative:    EXAMINATION:  XR TIBIA FIBULA 2 VIEW LEFT    CLINICAL HISTORY:  Injury, unspecified, initial encounter    COMPARISON:  X-rays dated 10/24/2023    FINDINGS:  There is improved alignment of the tibial and fibular diaphyseal fractures following reduction.  Splinting material is in place.                                       X-Ray Chest 1 View (Final result)  Result time 10/24/23 15:36:33      Final result by Shayy Carmichael MD (10/24/23 15:36:33)                   Impression:      Left basilar subsegmental atelectasis.      Electronically signed by: Shayy Carmichael  Date:    10/24/2023  Time:    15:36               Narrative:    EXAMINATION:  XR CHEST 1 VIEW    CLINICAL HISTORY:  weakness;    TECHNIQUE:  AP chest    COMPARISON:  None.    FINDINGS:  The heart is normal in size.  There is left basilar subsegmental axis.  There is otherwise no focal airspace consolidation.  There is no pleural effusion or visible pneumothorax.                                       X-Ray Tibia Fibula 2 View Left (Final result)  Result time 10/24/23 15:00:09      Final result by Duane Muller MD (10/24/23 15:00:09)                   Impression:      Fractures as above.      Electronically signed by: Duane Muller  Date:    10/24/2023  Time:    15:00               Narrative:    EXAMINATION:  XR TIBIA FIBULA 2 VIEW LEFT    CLINICAL HISTORY:  Injury, unspecified, initial encounter    COMPARISON:  None.    FINDINGS:  Comminuted displace fractures of the proximal to midshaft of both the tibia and fibula with over riding of fracture fragments    Joint spaces preserved.    No blastic or lytic lesions.    Soft tissues within normal limits.                                       X-Ray Knee Complete 4 or  More Views Left (Final result)  Result time 10/24/23 14:56:26      Final result by Duane Muller MD (10/24/23 14:56:26)                   Impression:      Fractures as above.      Electronically signed by: Duane Muller  Date:    10/24/2023  Time:    14:56               Narrative:    EXAMINATION:  XR KNEE COMP 4 OR MORE VIEWS LEFT    CLINICAL HISTORY:  Injury, unspecified, initial encounter    COMPARISON:  None.    FINDINGS:  Markedly displaced comminuted fracture involving the proximal to mid 3rd of the tibia and the proximal 3rd of the fibula with significant displacement and over riding of fracture fragments    There is some narrowing of the medial compartment of the knee joint    No blastic or lytic lesions.    Soft tissues within normal limits.                                       X-Ray Ankle Complete Left (Final result)  Result time 10/24/23 15:11:56      Final result by Shayy Carmichael MD (10/24/23 15:11:56)                   Impression:      No acute abnormality identified in the ankle.      Electronically signed by: Shayy Carmichael  Date:    10/24/2023  Time:    15:11               Narrative:    EXAMINATION:  XR ANKLE COMPLETE 3 VIEW LEFT    CLINICAL HISTORY:  Injury, unspecified, initial encounter    COMPARISON:  None.    FINDINGS:  Splinting material obscures detail.  There is no acute fracture or malalignment in the ankle.  There are fractures of the mid tibia and fibular diaphyses.                                       Lab Review:   CBC:  Recent Labs   Lab Result Units 10/24/23  1457 10/25/23  0242 10/26/23  0436   WBC x10(3)/mcL 5.13 5.64 7.62   RBC x10(6)/mcL 4.59* 4.23* 4.19*   Hgb g/dL 13.0* 12.2* 11.9*   Hct % 38.4* 36.3* 35.1*   Platelet x10(3)/mcL 218 185 201   MCV fL 83.7 85.8 83.8   MCH pg 28.3 28.8 28.4   MCHC g/dL 33.9 33.6 33.9       CMP:  Recent Labs   Lab Result Units 10/24/23  1529 10/25/23  0242 10/26/23  0436   Calcium Level Total mg/dL 8.5* 8.5* 8.6*   Albumin Level  "g/dL 3.4 3.3* 3.1*   Sodium Level mmol/L 140 137 138   Potassium Level mmol/L 3.9 4.3 4.5   Carbon Dioxide mmol/L 23 24 26   Blood Urea Nitrogen mg/dL 20.1 16.1 11.4   Creatinine mg/dL 0.85 0.77 0.82   Alkaline Phosphatase unit/L 58 60 58   Alanine Aminotransferase unit/L 25 31 25   Aspartate Aminotransferase unit/L 24 42* 35*   Bilirubin Total mg/dL 0.7 1.4 1.1       Troponin:  No results for input(s): "TROPONINI" in the last 2160 hours.    ETOH:  Recent Labs     10/24/23  1529   ETHANOL <10.0        Urine Drug Screen:  No results for input(s): "COCAINE", "OPIATE", "BARBITURATE", "AMPHETAMINE", "FENTANYL", "CANNABINOIDS", "MDMA" in the last 72 hours.    Invalid input(s): "BENZODIAZEPINE", "PHENCYCLIDINE"   Plan:   Patient is a 68 yo M with PMHx of HTN who presented as a fall sustaining a left tib-fib fracture. S/p IMN of left tib-fib on 10/25/23.     -Patient doing well post-operatively  -He has tolerated PO intake following surgery without issues  -Per ortho, he is WBAT to the ipsilateral extremity   -Lovenox for DVT ppx  -PT/OT today to determine dispo recommendations   -case management assisting with any needs at discharge    Zhen Kunz MD  General Surgery    "

## 2023-10-27 LAB
ALBUMIN SERPL-MCNC: 3.1 G/DL (ref 3.4–4.8)
ALBUMIN/GLOB SERPL: 1 RATIO (ref 1.1–2)
ALP SERPL-CCNC: 55 UNIT/L (ref 40–150)
ALT SERPL-CCNC: 22 UNIT/L (ref 0–55)
AST SERPL-CCNC: 42 UNIT/L (ref 5–34)
BASOPHILS # BLD AUTO: 0.03 X10(3)/MCL
BASOPHILS NFR BLD AUTO: 0.5 %
BILIRUB SERPL-MCNC: 0.9 MG/DL
BUN SERPL-MCNC: 13.6 MG/DL (ref 8.4–25.7)
CALCIUM SERPL-MCNC: 8.6 MG/DL (ref 8.8–10)
CHLORIDE SERPL-SCNC: 102 MMOL/L (ref 98–107)
CO2 SERPL-SCNC: 27 MMOL/L (ref 23–31)
CREAT SERPL-MCNC: 0.83 MG/DL (ref 0.73–1.18)
EOSINOPHIL # BLD AUTO: 0.14 X10(3)/MCL (ref 0–0.9)
EOSINOPHIL NFR BLD AUTO: 2.3 %
ERYTHROCYTE [DISTWIDTH] IN BLOOD BY AUTOMATED COUNT: 15.9 % (ref 11.5–17)
GFR SERPLBLD CREATININE-BSD FMLA CKD-EPI: >60 MLS/MIN/1.73/M2
GLOBULIN SER-MCNC: 3.1 GM/DL (ref 2.4–3.5)
GLUCOSE SERPL-MCNC: 120 MG/DL (ref 82–115)
HCT VFR BLD AUTO: 35 % (ref 42–52)
HGB BLD-MCNC: 11.7 G/DL (ref 14–18)
IMM GRANULOCYTES # BLD AUTO: 0.02 X10(3)/MCL (ref 0–0.04)
IMM GRANULOCYTES NFR BLD AUTO: 0.3 %
LYMPHOCYTES # BLD AUTO: 1.17 X10(3)/MCL (ref 0.6–4.6)
LYMPHOCYTES NFR BLD AUTO: 19.5 %
MCH RBC QN AUTO: 28.3 PG (ref 27–31)
MCHC RBC AUTO-ENTMCNC: 33.4 G/DL (ref 33–36)
MCV RBC AUTO: 84.5 FL (ref 80–94)
MONOCYTES # BLD AUTO: 0.93 X10(3)/MCL (ref 0.1–1.3)
MONOCYTES NFR BLD AUTO: 15.5 %
NEUTROPHILS # BLD AUTO: 3.71 X10(3)/MCL (ref 2.1–9.2)
NEUTROPHILS NFR BLD AUTO: 61.9 %
NRBC BLD AUTO-RTO: 0 %
PLATELET # BLD AUTO: 174 X10(3)/MCL (ref 130–400)
PMV BLD AUTO: 10.7 FL (ref 7.4–10.4)
POTASSIUM SERPL-SCNC: 4.8 MMOL/L (ref 3.5–5.1)
PROT SERPL-MCNC: 6.2 GM/DL (ref 5.8–7.6)
RBC # BLD AUTO: 4.14 X10(6)/MCL (ref 4.7–6.1)
SODIUM SERPL-SCNC: 138 MMOL/L (ref 136–145)
WBC # SPEC AUTO: 6 X10(3)/MCL (ref 4.5–11.5)

## 2023-10-27 PROCEDURE — 99900035 HC TECH TIME PER 15 MIN (STAT)

## 2023-10-27 PROCEDURE — 96372 THER/PROPH/DIAG INJ SC/IM: CPT

## 2023-10-27 PROCEDURE — 97530 THERAPEUTIC ACTIVITIES: CPT | Mod: CQ

## 2023-10-27 PROCEDURE — 80053 COMPREHEN METABOLIC PANEL: CPT

## 2023-10-27 PROCEDURE — 94799 UNLISTED PULMONARY SVC/PX: CPT

## 2023-10-27 PROCEDURE — 25000003 PHARM REV CODE 250

## 2023-10-27 PROCEDURE — 85025 COMPLETE CBC W/AUTO DIFF WBC: CPT

## 2023-10-27 PROCEDURE — 63600175 PHARM REV CODE 636 W HCPCS

## 2023-10-27 PROCEDURE — 97530 THERAPEUTIC ACTIVITIES: CPT | Mod: CO

## 2023-10-27 PROCEDURE — 97535 SELF CARE MNGMENT TRAINING: CPT | Mod: CO

## 2023-10-27 PROCEDURE — G0378 HOSPITAL OBSERVATION PER HR: HCPCS

## 2023-10-27 RX ADMIN — OXYCODONE HYDROCHLORIDE 5 MG: 5 TABLET ORAL at 05:10

## 2023-10-27 RX ADMIN — OXYCODONE HYDROCHLORIDE 10 MG: 10 TABLET ORAL at 03:10

## 2023-10-27 RX ADMIN — DOCUSATE SODIUM 100 MG: 100 CAPSULE, LIQUID FILLED ORAL at 09:10

## 2023-10-27 RX ADMIN — ENOXAPARIN SODIUM 40 MG: 40 INJECTION SUBCUTANEOUS at 09:10

## 2023-10-27 RX ADMIN — METHOCARBAMOL 500 MG: 500 TABLET ORAL at 05:10

## 2023-10-27 RX ADMIN — METHOCARBAMOL 500 MG: 500 TABLET ORAL at 02:10

## 2023-10-27 RX ADMIN — GABAPENTIN 300 MG: 300 CAPSULE ORAL at 03:10

## 2023-10-27 RX ADMIN — GABAPENTIN 300 MG: 300 CAPSULE ORAL at 09:10

## 2023-10-27 RX ADMIN — POLYETHYLENE GLYCOL 3350 17 G: 17 POWDER, FOR SOLUTION ORAL at 09:10

## 2023-10-27 RX ADMIN — OXYCODONE HYDROCHLORIDE 10 MG: 10 TABLET ORAL at 09:10

## 2023-10-27 RX ADMIN — METHOCARBAMOL 500 MG: 500 TABLET ORAL at 09:10

## 2023-10-27 NOTE — PT/OT/SLP PROGRESS
Physical Therapy Treatment    Patient Name:  Artemio Ramsey   MRN:  [id number]    Recommendations:     Discharge therapy intensity: high intensity   Discharge Equipment Recommendations: walker, rolling, bath bench  Barriers to discharge: Impaired mobility    Assessment:     Artemio Ramsey is a 67 y.o. male admitted with a medical diagnosis of Tibia/fibula fracture, left, closed, initial encounter.  He presents with the following impairments/functional limitations: weakness, gait instability, decreased lower extremity function, impaired balance, impaired endurance, impaired functional mobility, impaired self care skills, decreased safety awareness, orthopedic precautions.    Coordinated tx session with OT, planned to ambulate however OT reported pt was Max assist and confused today. Improved transfer during PT session. Will plan to ambulate at next tx session if appropriate.     Rehab Prognosis: Good; patient would benefit from acute skilled PT services to address these deficits and reach maximum level of function.    Recent Surgery: Procedure(s) (LRB):  INSERTION, INTRAMEDULLARY LOPEZ, TIBIA (Left) 2 Days Post-Op    Plan:     During this hospitalization, patient to be seen 6 x/week to address the identified rehab impairments via gait training, therapeutic activities, therapeutic exercises and progress toward the following goals:    Plan of Care Expires:  11/26/23    Subjective     Chief Complaint: leg discomfort  Patient/Family Comments/goals: to go home  Pain/Comfort:  Pain Rating 1: other (see comments) (reported pain and discomfort of LLE; did not rate)      Objective:     Communicated with RN prior to session.  Patient found up in chair with peripheral IV and chair alarm upon PT entry to room.     General Precautions: Standard, fall  Orthopedic Precautions: LLE weight bearing as tolerated  Braces: N/A  Respiratory Status: Room air  Blood Pressure: NT  Skin Integrity:  L lower leg wrapped in ace bandage      Functional  Mobility:  Bed Mobility:    Sit to supine with min assist  Transfers:    Sit<->stand from recliner with cues for positioning, hand placement and technique. Mod assist.   Stand step transfers from recliner to the bed with mod assist for balance and RW management. Required cues for sequencing and noted difficulty with taking steps posteriorly     Therapeutic Activities/Exercises:  LLE SLR, SAQ's and heel slides    Education:  Patient provided with verbal education education regarding POC and HEP.  Understanding was verbalized.     Patient left HOB elevated with all lines intact, call button in reach, and bed alarm on..    GOALS:   Multidisciplinary Problems       Physical Therapy Goals          Problem: Physical Therapy    Goal Priority Disciplines Outcome Goal Variances Interventions   Physical Therapy Goal     PT, PT/OT Ongoing, Progressing     Description: Goals to be met by: 23     Patient will increase functional independence with mobility by performin. Supine to sit with [place]  2. Sit to supine with [place]  3. Sit to stand transfer with Modified [place]  4. Gait  x 200 feet with Modified [place] using Rolling Walker.   5. Ascend/descend 4 stairs with no Handrails & Stand-by Assistance                         Time Tracking:     PT Received On: 10/27/23  PT Start Time: 1017     PT Stop Time: 1041  PT Total Time (min): 24 min     Billable Minutes: Therapeutic Activity 2 units    Treatment Type: Treatment  PT/PTA: PTA     Number of PTA visits since last PT visit: 1     10/27/2023

## 2023-10-27 NOTE — PLAN OF CARE
Spoke with Mathieu with [facility] [employer] [facility] @ [phone] . She updates me on the referral she receiving on both pt and his wife Janet. She will be coming to pts room 1014 on Monday and has my cell number

## 2023-10-27 NOTE — PT/OT/SLP PROGRESS
Occupational Therapy   Treatment    Name: Artemio Ramsey  MRN: [id number]  Admitting Diagnosis:  Tibia/fibula fracture, left, closed, initial encounter  2 Days Post-Op    Recommendations:     Recommended therapy intensity at discharge: High Intensity Therapy   Discharge Equipment Recommendations:  bath bench, walker, rolling  Barriers to discharge:       Assessment:     Artemio Ramsey is a 67 y.o. male with a medical diagnosis of Tibia/fibula fracture, left, closed, initial encounter. Performance deficits affecting function are weakness, gait instability, impaired endurance, impaired balance, decreased lower extremity function, decreased safety awareness, impaired self care skills, impaired cognition, impaired functional mobility. Presents with some confusion today. Pulled IV out, RN aware and present in room. Pt also urinated on floor and unable to recall that he is in the hospital.     Rehab Prognosis:  Good; patient would benefit from acute skilled OT services to address these deficits and reach maximum level of function.       Plan:     Patient to be seen 3 x/week to address the above listed problems via self-care/home management, therapeutic activities, therapeutic exercises  Plan of Care Expires: 11/23/23  Plan of Care Reviewed with: patient    Subjective     Pain/Comfort:  Location - Side 1: Left  Location 1: leg  Pain Addressed 1: Reposition, Distraction    Objective:     Communicated with: RN prior to session.  Patient found supine with peripheral IV upon OT entry to room.    General Precautions: Standard, fall    Orthopedic Precautions:LLE weight bearing as tolerated  Braces: N/A  Respiratory Status: Room air     Occupational Performance:     Bed Mobility:    Patient completed Supine to Sit with moderate assistance     Functional Mobility/Transfers:  Patient completed Sit <> Stand Transfer with maximal assistance and of 2 persons  with  rolling walker x3 trials from EOB to change clothes and sheets soiled with  urine. Cues to push from bed rather than pulling walker however poor carryover.   Functional Mobility: performed stand step t/f from bed<>chair with Min-Mod A and RW. Max cues for safety. Difficulty fully Wb'ing through LLE.     Activities of Daily Living:  UE dressing: doffed t-sirt with independence. Donned front and back gown with set-up A for management of buttons.   LE dressing: donned/doffed underwear with Max A.     Therapeutic Positioning    OT interventions performed during the course of today's session in an effort to prevent and/or reduce acquired pressure injuries:   Education was provided on benefits of and recommendations for therapeutic positioning    Skin assessment: all bony prominences were assessed    Findings: no redness or breakdown noted    [facility] 6 Click ADL: 16    Patient Education:  Patient provided with verbal education education regarding OT role/goals/POC and safety awareness.  Understanding was verbalized, however additional teaching warranted.      Patient left up in chair with all lines intact, call button in reach, and chair alarm on    GOALS:   Multidisciplinary Problems       Occupational Therapy Goals          Problem: Occupational Therapy    Goal Priority Disciplines Outcome Interventions   Occupational Therapy Goal     OT, PT/OT Ongoing, Progressing    Description: STG: to be met by 11/26/23    Pt will complete grooming standing at sink with LRAD with SBA.  Pt will complete UB dressing independently while seated.  Pt will complete LB dressing with SBA using LRAD and AE prn.   Pt will complete toileting with SBA using LRAD.  Pt will complete functional mobility to/from toilet and toilet transfer with SBA using LRAD.                        Time Tracking:     OT Date of Treatment: 10/27/23  OT Start Time: 0908  OT Stop Time: 0931  OT Total Time (min): 23 min    Billable Minutes:Self Care/Home Management 15  Therapeutic Activity 8    OT/JOCELYNN: JOCELYNN     Number of JOCELYNN visits since last OT  visit: 1    10/27/2023

## 2023-10-27 NOTE — PROGRESS NOTES
"[facility]  Orthopedics  Progress Note    Patient Name: Artemio Ramsey  MRN: [id number]  Admission Date: 10/24/2023  Hospital Length of Stay: 1 days  Attending Provider: Jose Ramos DO  Primary Care Provider: Cherelle, Primary Doctor  Follow-up For: Procedure(s) (LRB):  INSERTION, INTRAMEDULLARY LOPEZ, TIBIA (Left)    Post-Operative Day: 2 Days Post-Op  Subjective:     Principal Problem:Tibia/fibula fracture, left, closed, initial encounter    Principal Orthopedic Problem: 2 Days Post-Op   Left tibial nail    Interval History:  Patient doing well states his pain is well controlled.  No numbness or tingling    Review of patient's allergies indicates:  No Known Allergies    Current Facility-Administered Medications   Medication    docusate sodium capsule 100 mg    enoxaparin injection 40 mg    gabapentin capsule 300 mg    HYDROmorphone (PF) injection 1 mg    influenza 65up-adj (QUADRIVALENT ADJUVANTED PF) vaccine 0.5 mL    magnesium hydroxide 400 mg/5 ml suspension 2,400 mg    melatonin tablet 6 mg    methocarbamoL tablet 500 mg    oxyCODONE immediate release tablet 5 mg    oxyCODONE immediate release tablet Tab 10 mg    pneumoc 20-evelio conj-dip cr(PF) (PREVNAR-20 (PF)) injection Syrg 0.5 mL    polyethylene glycol packet 17 g    sodium chloride 0.9% flush 10 mL     Objective:     Vital Signs (Most Recent):  Temp: 98.1 °F (36.7 °C) (10/27/23 0727)  Pulse: 70 (10/27/23 0727)  Resp: 20 (10/27/23 0727)  BP: 116/66 (10/27/23 0727)  SpO2: (!) 90 % (10/27/23 0727) Vital Signs (24h Range):  Temp:  [97.6 °F (36.4 °C)-98.3 °F (36.8 °C)] 98.1 °F (36.7 °C)  Pulse:  [60-74] 70  Resp:  [16-20] 20  SpO2:  [90 %-96 %] 90 %  BP: (100-127)/(63-74) 116/66     Weight: 69.9 kg (154 lb)  Height: 5' 11" (180.3 cm)  Body mass index is 21.48 kg/m².      Intake/Output Summary (Last 24 hours) at 10/27/2023 0841  Last data filed at 10/26/2023 2136  Gross per 24 hour   Intake 600 ml   Output 1050 ml   Net -450 ml       Physical " Exam:     General the patient is alert  no acute distress nontoxic-appearing appropriate affect.    Constitutional: Vital signs are examined and stable.  Resp: No signs of labored breathing    LLE: -Skin: Dressing CDI, No signs of new abrasions or lacerations, no scars           -MSK: EHL/FHL, Gastroc/Tib anterior Strength 5/5           -Neuro:  Sensation intact to light touch L3-S1 dermatomes           -Lymphatic: No signs of lymphadenopathy           -CV: Capillary refill is less than 2 seconds. DP/PT pulses 2/4. Compartments soft and compressible                          Diagnostic Findings:   Significant Labs:   Recent Lab Results  (Last 5 results in the past 72 hours)        10/27/23  0549   10/26/23  0436   10/25/23  0242   10/25/23  0013   10/24/23  1601        Albumin/Globulin Ratio 1.0   1.0   1.2           ABO and RH         O POS       Albumin 3.1   3.1   3.3           ALP 55   58   60           ALT 22   25   31           Appearance, UA       Clear         AST 42   35   42           Bacteria, UA       None Seen         Baso # 0.03   0.01   0.02           Basophil % 0.5   0.1   0.4           BILIRUBIN TOTAL 0.9   1.1   1.4           Bilirubin, UA       Negative         BUN 13.6   11.4   16.1           Calcium 8.6   8.6   8.5           Chloride 102   103   106           CO2 27   26   24           Color, UA       Yellow         Creatinine 0.83   0.82   0.77           CRP   84.40             eGFR >60   >60   >60           Eos # 0.14   0.00   0.07           Eosinophil % 2.3   0.0   1.2           Globulin, Total 3.1   3.1   2.8           Glucose 120   133   89           Glucose, UA       Negative         Hematocrit 35.0   35.1   36.3           Hemoglobin 11.7   11.9   12.2           Immature Grans (Abs) 0.02   0.04   0.01           Immature Granulocytes 0.3   0.5   0.2           Ketones, UA       Trace         Leukocytes, UA       Negative         Lymph # 1.17   0.65   1.49           LYMPH % 19.5   8.5   26.4            Magnesium      1.50           MCH 28.3   28.4   28.8           MCHC 33.4   33.9   33.6           MCV 84.5   83.8   85.8           Mono # 0.93   0.86   0.58           Mono % 15.5   11.3   10.3           MPV 10.7   10.6   9.5           Neut # 3.71   6.06   3.47           Neut % 61.9   79.6   61.5           NITRITE UA       Negative         nRBC 0.0   0.0   0.0           Occult Blood UA       Negative         pH, UA       5.0         Phosphorus Level     3.1           Platelet Count 174   201   185           Potassium 4.8   4.5   4.3           Prealbumin   15.7             PROTEIN TOTAL 6.2   6.2   6.1           Protein, UA       Negative         RBC 4.14   4.19   4.23           RBC, UA       <5         RDW 15.9   15.9   16.1           Sodium 138   138   137           Specific Gravity,UA       1.022         Squam Epithel, UA       <5         Urobilinogen, UA       0.2         WBC, UA       <5         WBC 6.00   7.62   5.64                                   Significant Imaging: I have reviewed all pertinent imaging results/findings.    Assessment/Plan:     Active Diagnoses:    Diagnosis Date Noted POA    PRINCIPAL PROBLEM:  Tibia/fibula fracture, left, closed, initial encounter [S82.202A, S82.402A] 10/24/2023 Yes    Trauma [T14.90XA] 10/25/2023 Unknown      Problems Resolved During this Admission:       Patient is status post left tibial shaft open reduction internal fixation with a tibial nail.  He has been doing well.  Weightbearing as tolerated left lower extremity.  Mild confusion at baseline.  Currently removed his IV which will replace another 1.  Patient will likely be a candidate for placement.  Recommend work with physical therapy pain control weightbearing as tolerated DVT prophylaxis discharge when ready.       This note/OR report was created with the assistance of  voice recognition software or phone  dictation.  There may be transcription errors as a result of using this technology however minimal.  Effort has been made to assure accuracy of transcription but any obvious errors or omissions should be clarified with the author of the document.           Jose Ramos DO  Orthopedic Trauma Surgery  [facility]

## 2023-10-28 LAB
ALBUMIN SERPL-MCNC: 2.9 G/DL (ref 3.4–4.8)
ALBUMIN/GLOB SERPL: 1 RATIO (ref 1.1–2)
ALP SERPL-CCNC: 54 UNIT/L (ref 40–150)
ALT SERPL-CCNC: 22 UNIT/L (ref 0–55)
AST SERPL-CCNC: 34 UNIT/L (ref 5–34)
BASOPHILS # BLD AUTO: 0.03 X10(3)/MCL
BASOPHILS NFR BLD AUTO: 0.6 %
BILIRUB SERPL-MCNC: 1 MG/DL
BUN SERPL-MCNC: 11.3 MG/DL (ref 8.4–25.7)
CALCIUM SERPL-MCNC: 8.8 MG/DL (ref 8.8–10)
CHLORIDE SERPL-SCNC: 102 MMOL/L (ref 98–107)
CO2 SERPL-SCNC: 29 MMOL/L (ref 23–31)
CREAT SERPL-MCNC: 0.71 MG/DL (ref 0.73–1.18)
EOSINOPHIL # BLD AUTO: 0.17 X10(3)/MCL (ref 0–0.9)
EOSINOPHIL NFR BLD AUTO: 3.4 %
ERYTHROCYTE [DISTWIDTH] IN BLOOD BY AUTOMATED COUNT: 15.6 % (ref 11.5–17)
GFR SERPLBLD CREATININE-BSD FMLA CKD-EPI: >60 MLS/MIN/1.73/M2
GLOBULIN SER-MCNC: 2.9 GM/DL (ref 2.4–3.5)
GLUCOSE SERPL-MCNC: 108 MG/DL (ref 82–115)
HCT VFR BLD AUTO: 35 % (ref 42–52)
HGB BLD-MCNC: 11.5 G/DL (ref 14–18)
IMM GRANULOCYTES # BLD AUTO: 0.02 X10(3)/MCL (ref 0–0.04)
IMM GRANULOCYTES NFR BLD AUTO: 0.4 %
LYMPHOCYTES # BLD AUTO: 1.35 X10(3)/MCL (ref 0.6–4.6)
LYMPHOCYTES NFR BLD AUTO: 26.7 %
MCH RBC QN AUTO: 27.8 PG (ref 27–31)
MCHC RBC AUTO-ENTMCNC: 32.9 G/DL (ref 33–36)
MCV RBC AUTO: 84.7 FL (ref 80–94)
MONOCYTES # BLD AUTO: 0.78 X10(3)/MCL (ref 0.1–1.3)
MONOCYTES NFR BLD AUTO: 15.4 %
NEUTROPHILS # BLD AUTO: 2.71 X10(3)/MCL (ref 2.1–9.2)
NEUTROPHILS NFR BLD AUTO: 53.5 %
NRBC BLD AUTO-RTO: 0 %
PLATELET # BLD AUTO: 207 X10(3)/MCL (ref 130–400)
PMV BLD AUTO: 11.3 FL (ref 7.4–10.4)
POTASSIUM SERPL-SCNC: 4.1 MMOL/L (ref 3.5–5.1)
PROT SERPL-MCNC: 5.8 GM/DL (ref 5.8–7.6)
RBC # BLD AUTO: 4.13 X10(6)/MCL (ref 4.7–6.1)
SODIUM SERPL-SCNC: 138 MMOL/L (ref 136–145)
WBC # SPEC AUTO: 5.06 X10(3)/MCL (ref 4.5–11.5)

## 2023-10-28 PROCEDURE — G0378 HOSPITAL OBSERVATION PER HR: HCPCS

## 2023-10-28 PROCEDURE — 96372 THER/PROPH/DIAG INJ SC/IM: CPT

## 2023-10-28 PROCEDURE — 97110 THERAPEUTIC EXERCISES: CPT | Mod: CQ

## 2023-10-28 PROCEDURE — 85025 COMPLETE CBC W/AUTO DIFF WBC: CPT

## 2023-10-28 PROCEDURE — 94761 N-INVAS EAR/PLS OXIMETRY MLT: CPT

## 2023-10-28 PROCEDURE — 25000003 PHARM REV CODE 250

## 2023-10-28 PROCEDURE — 97116 GAIT TRAINING THERAPY: CPT | Mod: CQ

## 2023-10-28 PROCEDURE — 63600175 PHARM REV CODE 636 W HCPCS

## 2023-10-28 PROCEDURE — 80053 COMPREHEN METABOLIC PANEL: CPT

## 2023-10-28 RX ADMIN — GABAPENTIN 300 MG: 300 CAPSULE ORAL at 03:10

## 2023-10-28 RX ADMIN — OXYCODONE HYDROCHLORIDE 5 MG: 5 TABLET ORAL at 09:10

## 2023-10-28 RX ADMIN — METHOCARBAMOL 500 MG: 500 TABLET ORAL at 05:10

## 2023-10-28 RX ADMIN — POLYETHYLENE GLYCOL 3350 17 G: 17 POWDER, FOR SOLUTION ORAL at 09:10

## 2023-10-28 RX ADMIN — GABAPENTIN 300 MG: 300 CAPSULE ORAL at 08:10

## 2023-10-28 RX ADMIN — DOCUSATE SODIUM 100 MG: 100 CAPSULE, LIQUID FILLED ORAL at 09:10

## 2023-10-28 RX ADMIN — GABAPENTIN 300 MG: 300 CAPSULE ORAL at 09:10

## 2023-10-28 RX ADMIN — METHOCARBAMOL 500 MG: 500 TABLET ORAL at 08:10

## 2023-10-28 RX ADMIN — OXYCODONE HYDROCHLORIDE 5 MG: 5 TABLET ORAL at 08:10

## 2023-10-28 RX ADMIN — ENOXAPARIN SODIUM 40 MG: 40 INJECTION SUBCUTANEOUS at 08:10

## 2023-10-28 RX ADMIN — METHOCARBAMOL 500 MG: 500 TABLET ORAL at 01:10

## 2023-10-28 RX ADMIN — Medication 6 MG: at 08:10

## 2023-10-28 RX ADMIN — DOCUSATE SODIUM 100 MG: 100 CAPSULE, LIQUID FILLED ORAL at 08:10

## 2023-10-28 RX ADMIN — ENOXAPARIN SODIUM 40 MG: 40 INJECTION SUBCUTANEOUS at 09:10

## 2023-10-28 RX ADMIN — OXYCODONE HYDROCHLORIDE 5 MG: 5 TABLET ORAL at 03:10

## 2023-10-28 NOTE — PROGRESS NOTES
"[facility] Neuro  Orthopedics  Progress Note    Patient Name: Artemio Ramsey  MRN: [id number]  Admission Date: 10/24/2023  Hospital Length of Stay: 1 days  Attending Provider: Jose Ramos DO  Primary Care Provider: Cherelle, Primary Doctor  Follow-up For: Procedure(s) (LRB):  INSERTION, INTRAMEDULLARY LOPEZ, TIBIA (Left)    Post-Operative Day: 3 Days Post-Op  Subjective:     Principal Problem:Tibia/fibula fracture, left, closed, initial encounter    Principal Orthopedic Problem: 3 Days Post-Op   Left tibial nail    Interval History:  Patient doing well states his pain is well controlled.  No numbness or tingling    Review of patient's allergies indicates:  No Known Allergies    Current Facility-Administered Medications   Medication    docusate sodium capsule 100 mg    enoxaparin injection 40 mg    gabapentin capsule 300 mg    HYDROmorphone (PF) injection 1 mg    influenza 65up-adj (QUADRIVALENT ADJUVANTED PF) vaccine 0.5 mL    magnesium hydroxide 400 mg/5 ml suspension 2,400 mg    melatonin tablet 6 mg    methocarbamoL tablet 500 mg    oxyCODONE immediate release tablet 5 mg    oxyCODONE immediate release tablet Tab 10 mg    pneumoc 20-evelio conj-dip cr(PF) (PREVNAR-20 (PF)) injection Syrg 0.5 mL    polyethylene glycol packet 17 g    sodium chloride 0.9% flush 10 mL     Objective:     Vital Signs (Most Recent):  Temp: 98.1 °F (36.7 °C) (10/28/23 0743)  Pulse: (!) 58 (10/28/23 0743)  Resp: 18 (10/28/23 0743)  BP: 123/76 (10/28/23 0743)  SpO2: 95 % (10/28/23 0743) Vital Signs (24h Range):  Temp:  [97.5 °F (36.4 °C)-98.8 °F (37.1 °C)] 98.1 °F (36.7 °C)  Pulse:  [] 58  Resp:  [18-20] 18  SpO2:  [93 %-98 %] 95 %  BP: (115-132)/(67-76) 123/76     Weight: 69.9 kg (154 lb)  Height: 5' 11" (180.3 cm)  Body mass index is 21.48 kg/m².      Intake/Output Summary (Last 24 hours) at 10/28/2023 0927  Last data filed at 10/28/2023 0625  Gross per 24 hour   Intake 480 ml   Output 1150 ml   Net -670 ml "       Physical Exam:     General the patient is alert  no acute distress nontoxic-appearing appropriate affect.    Constitutional: Vital signs are examined and stable.  Resp: No signs of labored breathing    LLE: -Skin: Dressing CDI, No signs of new abrasions or lacerations, no scars           -MSK: EHL/FHL, Gastroc/Tib anterior Strength 5/5           -Neuro:  Sensation intact to light touch L3-S1 dermatomes           -Lymphatic: No signs of lymphadenopathy           -CV: Capillary refill is less than 2 seconds. DP/PT pulses 2/4. Compartments soft and compressible                          Diagnostic Findings:   Significant Labs:   Recent Lab Results         10/28/23  0714   10/27/23  0549   10/26/23  0436        Albumin/Globulin Ratio 1.0   1.0   1.0       Albumin 2.9   3.1   3.1       ALP 54   55   58       ALT 22   22   25       AST 34   42   35       Baso # 0.03   0.03   0.01       Basophil % 0.6   0.5   0.1       BILIRUBIN TOTAL 1.0   0.9   1.1       BUN 11.3   13.6   11.4       Calcium 8.8   8.6   8.6       Chloride 102   102   103       CO2 29   27   26       Creatinine 0.71   0.83   0.82       CRP     84.40       eGFR >60   >60   >60       Eos # 0.17   0.14   0.00       Eosinophil % 3.4   2.3   0.0       Globulin, Total 2.9   3.1   3.1       Glucose 108   120   133       Hematocrit 35.0   35.0   35.1       Hemoglobin 11.5   11.7   11.9       Immature Grans (Abs) 0.02   0.02   0.04       Immature Granulocytes 0.4   0.3   0.5       Lymph # 1.35   1.17   0.65       LYMPH % 26.7   19.5   8.5       MCH 27.8   28.3   28.4       MCHC 32.9   33.4   33.9       MCV 84.7   84.5   83.8       Mono # 0.78   0.93   0.86       Mono % 15.4   15.5   11.3       MPV 11.3   10.7   10.6       Neut # 2.71   3.71   6.06       Neut % 53.5   61.9   79.6       nRBC 0.0   0.0   0.0       Platelet Count 207   174   201       Potassium 4.1   4.8   4.5       Prealbumin     15.7       PROTEIN TOTAL 5.8   6.2   6.2       RBC 4.13   4.14    4.19       RDW 15.6   15.9   15.9       Sodium 138   138   138       WBC 5.06   6.00   7.62                Significant Imaging: I have reviewed all pertinent imaging results/findings.    Assessment/Plan:     Active Diagnoses:    Diagnosis Date Noted POA    PRINCIPAL PROBLEM:  Tibia/fibula fracture, left, closed, initial encounter [S82.202A, S82.402A] 10/24/2023 Yes    Trauma [T14.90XA] 10/25/2023 Unknown      Problems Resolved During this Admission:       Patient is status post left tibial shaft open reduction internal fixation with a tibial nail.  He has been doing well.  Weightbearing as tolerated left lower extremity.  Mild confusion at baseline.  Case management consulted for placement.  Recommend work with physical therapy pain control weightbearing as tolerated DVT prophylaxis, Lovenox discharge when ready.       This note/OR report was created with the assistance of  voice recognition software or phone  dictation.  There may be transcription errors as a result of using this technology however minimal. Effort has been made to assure accuracy of transcription but any obvious errors or omissions should be clarified with the author of the document.           Iron Davis PA-C  Orthopedic Trauma Surgery  [facility]

## 2023-10-28 NOTE — PT/OT/SLP PROGRESS
Physical Therapy Treatment    Patient Name:  Artemio Ramsey   MRN:  [id number]    Recommendations:     Discharge therapy intensity: high intensity   Discharge Equipment Recommendations: walker, rolling, bath bench  Barriers to discharge: Impaired mobility    Assessment:     Artemio Ramsey is a 67 y.o. male admitted with a medical diagnosis of Tibia/fibula fracture, left, closed, initial encounter.  He presents with the following impairments/functional limitations: weakness, gait instability, decreased lower extremity function, impaired balance, impaired endurance, impaired functional mobility, impaired self care skills, decreased safety awareness, orthopedic precautions.    Pt tolerated session well today. MIN A for all mobility. Demo'd readiness to participate. Ambulated 41' with RW, CGA.     Rehab Prognosis: Good; patient would benefit from acute skilled PT services to address these deficits and reach maximum level of function.    Recent Surgery: Procedure(s) (LRB):  INSERTION, INTRAMEDULLARY LOPEZ, TIBIA (Left) 3 Days Post-Op    Plan:     During this hospitalization, patient to be seen 6 x/week to address the identified rehab impairments via gait training, therapeutic activities, therapeutic exercises and progress toward the following goals:    Plan of Care Expires:  11/26/23    Subjective     Chief Complaint: none  Patient/Family Comments/goals: to go home  Pain/Comfort:  Pain Rating 1: 0/10      Objective:     Communicated with RN prior to session.  Patient found HOB elevated with bed alarm and chair alarm upon PT entry to room.     General Precautions: Standard, fall  Orthopedic Precautions: LLE weight bearing as tolerated  Braces: N/A  Respiratory Status: Room air  Blood Pressure: NT  Skin Integrity:  L lower leg wrapped in ace bandage      Functional Mobility:  Bed Mobility:    Sit to supine with min assist  Transfers:    Sit<->stand from EOB, min A with RW  Gait:   Pt ambulated 16ft x 25ft with RW, CGA. Demo'd  decreased WB on LLE and shortened step length. No unsteadiness or major LOB.      Therapeutic Activities/Exercises:  BLE LAQ x15 reps while sitting in recliner.    Education:  Patient provided with verbal education education regarding POC and HEP, fall prevention, call bell use, safety.  Understanding was verbalized.     Patient left up in chair with all lines intact, call button in reach, chair alarm on, and RN notified..    GOALS:   Multidisciplinary Problems       Physical Therapy Goals          Problem: Physical Therapy    Goal Priority Disciplines Outcome Goal Variances Interventions   Physical Therapy Goal     PT, PT/OT Ongoing, Progressing     Description: Goals to be met by: 23     Patient will increase functional independence with mobility by performin. Supine to sit with [place]  2. Sit to supine with [place]  3. Sit to stand transfer with Modified [place]  4. Gait  x 200 feet with Modified [place] using Rolling Walker.   5. Ascend/descend 4 stairs with no Handrails & Stand-by Assistance                         Time Tracking:     PT Received On:    PT Start Time: 1136     PT Stop Time: 1159  PT Total Time (min): 23 min     Billable Minutes: Gait Training 15 and Therapeutic Exercise 8    Treatment Type: Treatment  PT/PTA: PTA     Number of PTA visits since last PT visit: 2     10/28/2023

## 2023-10-29 LAB
ALBUMIN SERPL-MCNC: 2.9 G/DL (ref 3.4–4.8)
ALBUMIN/GLOB SERPL: 0.8 RATIO (ref 1.1–2)
ALP SERPL-CCNC: 56 UNIT/L (ref 40–150)
ALT SERPL-CCNC: 20 UNIT/L (ref 0–55)
AST SERPL-CCNC: 29 UNIT/L (ref 5–34)
BASOPHILS # BLD AUTO: 0.02 X10(3)/MCL
BASOPHILS NFR BLD AUTO: 0.4 %
BILIRUB SERPL-MCNC: 1.1 MG/DL
BUN SERPL-MCNC: 14.3 MG/DL (ref 8.4–25.7)
CALCIUM SERPL-MCNC: 9.5 MG/DL (ref 8.8–10)
CHLORIDE SERPL-SCNC: 101 MMOL/L (ref 98–107)
CO2 SERPL-SCNC: 32 MMOL/L (ref 23–31)
CREAT SERPL-MCNC: 0.7 MG/DL (ref 0.73–1.18)
EOSINOPHIL # BLD AUTO: 0.17 X10(3)/MCL (ref 0–0.9)
EOSINOPHIL NFR BLD AUTO: 3.2 %
ERYTHROCYTE [DISTWIDTH] IN BLOOD BY AUTOMATED COUNT: 15.4 % (ref 11.5–17)
GFR SERPLBLD CREATININE-BSD FMLA CKD-EPI: >60 MLS/MIN/1.73/M2
GLOBULIN SER-MCNC: 3.6 GM/DL (ref 2.4–3.5)
GLUCOSE SERPL-MCNC: 94 MG/DL (ref 82–115)
HCT VFR BLD AUTO: 36.5 % (ref 42–52)
HGB BLD-MCNC: 12 G/DL (ref 14–18)
IMM GRANULOCYTES # BLD AUTO: 0.02 X10(3)/MCL (ref 0–0.04)
IMM GRANULOCYTES NFR BLD AUTO: 0.4 %
LYMPHOCYTES # BLD AUTO: 1.6 X10(3)/MCL (ref 0.6–4.6)
LYMPHOCYTES NFR BLD AUTO: 30 %
MCH RBC QN AUTO: 28.2 PG (ref 27–31)
MCHC RBC AUTO-ENTMCNC: 32.9 G/DL (ref 33–36)
MCV RBC AUTO: 85.9 FL (ref 80–94)
MONOCYTES # BLD AUTO: 0.79 X10(3)/MCL (ref 0.1–1.3)
MONOCYTES NFR BLD AUTO: 14.8 %
NEUTROPHILS # BLD AUTO: 2.73 X10(3)/MCL (ref 2.1–9.2)
NEUTROPHILS NFR BLD AUTO: 51.2 %
NRBC BLD AUTO-RTO: 0 %
PLATELET # BLD AUTO: 214 X10(3)/MCL (ref 130–400)
PMV BLD AUTO: 10.7 FL (ref 7.4–10.4)
POTASSIUM SERPL-SCNC: 3.7 MMOL/L (ref 3.5–5.1)
PROT SERPL-MCNC: 6.5 GM/DL (ref 5.8–7.6)
RBC # BLD AUTO: 4.25 X10(6)/MCL (ref 4.7–6.1)
SODIUM SERPL-SCNC: 138 MMOL/L (ref 136–145)
WBC # SPEC AUTO: 5.33 X10(3)/MCL (ref 4.5–11.5)

## 2023-10-29 PROCEDURE — 80053 COMPREHEN METABOLIC PANEL: CPT

## 2023-10-29 PROCEDURE — G0378 HOSPITAL OBSERVATION PER HR: HCPCS

## 2023-10-29 PROCEDURE — 25000003 PHARM REV CODE 250

## 2023-10-29 PROCEDURE — 85025 COMPLETE CBC W/AUTO DIFF WBC: CPT

## 2023-10-29 PROCEDURE — 94761 N-INVAS EAR/PLS OXIMETRY MLT: CPT

## 2023-10-29 PROCEDURE — 96372 THER/PROPH/DIAG INJ SC/IM: CPT

## 2023-10-29 PROCEDURE — 63600175 PHARM REV CODE 636 W HCPCS

## 2023-10-29 RX ADMIN — OXYCODONE HYDROCHLORIDE 5 MG: 5 TABLET ORAL at 06:10

## 2023-10-29 RX ADMIN — GABAPENTIN 300 MG: 300 CAPSULE ORAL at 08:10

## 2023-10-29 RX ADMIN — OXYCODONE HYDROCHLORIDE 5 MG: 5 TABLET ORAL at 10:10

## 2023-10-29 RX ADMIN — METHOCARBAMOL 500 MG: 500 TABLET ORAL at 05:10

## 2023-10-29 RX ADMIN — DOCUSATE SODIUM 100 MG: 100 CAPSULE, LIQUID FILLED ORAL at 08:10

## 2023-10-29 RX ADMIN — METHOCARBAMOL 500 MG: 500 TABLET ORAL at 02:10

## 2023-10-29 RX ADMIN — METHOCARBAMOL 500 MG: 500 TABLET ORAL at 08:10

## 2023-10-29 RX ADMIN — ENOXAPARIN SODIUM 40 MG: 40 INJECTION SUBCUTANEOUS at 08:10

## 2023-10-29 RX ADMIN — OXYCODONE HYDROCHLORIDE 5 MG: 5 TABLET ORAL at 03:10

## 2023-10-29 RX ADMIN — GABAPENTIN 300 MG: 300 CAPSULE ORAL at 03:10

## 2023-10-29 RX ADMIN — OXYCODONE HYDROCHLORIDE 5 MG: 5 TABLET ORAL at 05:10

## 2023-10-29 RX ADMIN — POLYETHYLENE GLYCOL 3350 17 G: 17 POWDER, FOR SOLUTION ORAL at 08:10

## 2023-10-29 NOTE — PROGRESS NOTES
"[facility]  Progress Note    Patient Name: Artemio Ramsey  MRN: [id number]  Admission Date: 10/24/2023  Hospital Length of Stay: 1 days  Attending Provider: Jose Ramos DO  Primary Care Provider: Cherelle, Primary Doctor  Follow-up For: Procedure(s) (LRB):  INSERTION, INTRAMEDULLARY LOPEZ, TIBIA (Left)    Post-Operative Day: 4 Days Post-Op  Subjective:     Principal Problem:Tibia/fibula fracture, left, closed, initial encounter    Principal Orthopedic Problem: 4 Days Post-Op   Left tibial nail    Interval History:  Patient doing well states his pain is well controlled.  He is awake in bed, alert.  He ambulated with therapy yesterday 25 ft contact guard assist.  No numbness or tingling    Review of patient's allergies indicates:  No Known Allergies    Current Facility-Administered Medications   Medication    docusate sodium capsule 100 mg    enoxaparin injection 40 mg    gabapentin capsule 300 mg    HYDROmorphone (PF) injection 1 mg    influenza 65up-adj (QUADRIVALENT ADJUVANTED PF) vaccine 0.5 mL    magnesium hydroxide 400 mg/5 ml suspension 2,400 mg    melatonin tablet 6 mg    methocarbamoL tablet 500 mg    oxyCODONE immediate release tablet 5 mg    oxyCODONE immediate release tablet Tab 10 mg    pneumoc 20-eveloi conj-dip cr(PF) (PREVNAR-20 (PF)) injection Syrg 0.5 mL    polyethylene glycol packet 17 g    sodium chloride 0.9% flush 10 mL     Objective:     Vital Signs (Most Recent):  Temp: 97.7 °F (36.5 °C) (10/29/23 0822)  Pulse: (!) 54 (10/29/23 0822)  Resp: 19 (10/29/23 0822)  BP: 117/65 (10/29/23 0822)  SpO2: (!) 94 % (10/29/23 0822) Vital Signs (24h Range):  Temp:  [97.7 °F (36.5 °C)-98.2 °F (36.8 °C)] 97.7 °F (36.5 °C)  Pulse:  [51-72] 54  Resp:  [16-19] 19  SpO2:  [94 %-98 %] 94 %  BP: (117-161)/(65-81) 117/65     Weight: 69.9 kg (154 lb)  Height: 5' 11" (180.3 cm)  Body mass index is 21.48 kg/m².      Intake/Output Summary (Last 24 hours) at 10/29/2023 0856  Last data filed at " 10/29/2023 0500  Gross per 24 hour   Intake 720 ml   Output 1100 ml   Net -380 ml       Physical Exam:     General the patient is alert  no acute distress nontoxic-appearing appropriate affect.    Constitutional: Vital signs are examined and stable.  Resp: No signs of labored breathing    LLE: -Skin: Dressing CDI, No signs of new abrasions or lacerations, no scars           -MSK: EHL/FHL, Gastroc/Tib anterior Strength 5/5           -Neuro:  Sensation intact to light touch L3-S1 dermatomes           -Lymphatic: No signs of lymphadenopathy           -CV: Capillary refill is less than 2 seconds. DP/PT pulses 2/4. Compartments soft and compressible                          Diagnostic Findings:   Significant Labs:   Recent Lab Results         10/29/23  0548   10/29/23  0526   10/28/23  0714   10/27/23  0549        Albumin/Globulin Ratio   0.8   1.0   1.0       Albumin   2.9   2.9   3.1       ALP   56   54   55       ALT   20   22   22       AST   29   34   42       Baso # 0.02     0.03   0.03       Basophil % 0.4     0.6   0.5       BILIRUBIN TOTAL   1.1   1.0   0.9       BUN   14.3   11.3   13.6       Calcium   9.5   8.8   8.6       Chloride   101   102   102       CO2   32   29   27       Creatinine   0.70   0.71   0.83       eGFR   >60   >60   >60       Eos # 0.17     0.17   0.14       Eosinophil % 3.2     3.4   2.3       Globulin, Total   3.6   2.9   3.1       Glucose   94   108   120       Hematocrit 36.5     35.0   35.0       Hemoglobin 12.0     11.5   11.7       Immature Grans (Abs) 0.02     0.02   0.02       Immature Granulocytes 0.4     0.4   0.3       Lymph # 1.60     1.35   1.17       LYMPH % 30.0     26.7   19.5       MCH 28.2     27.8   28.3       MCHC 32.9     32.9   33.4       MCV 85.9     84.7   84.5       Mono # 0.79     0.78   0.93       Mono % 14.8     15.4   15.5       MPV 10.7     11.3   10.7       Neut # 2.73     2.71   3.71       Neut % 51.2     53.5   61.9       nRBC 0.0     0.0   0.0        Platelet Count 214     207   174       Potassium   3.7   4.1   4.8       PROTEIN TOTAL   6.5   5.8   6.2       RBC 4.25     4.13   4.14       RDW 15.4     15.6   15.9       Sodium   138   138   138       WBC 5.33     5.06   6.00                Significant Imaging: I have reviewed all pertinent imaging results/findings.    Assessment/Plan:     Active Diagnoses:    Diagnosis Date Noted POA    PRINCIPAL PROBLEM:  Tibia/fibula fracture, left, closed, initial encounter [S82.202A, S82.402A] 10/24/2023 Yes    Trauma [T14.90XA] 10/25/2023 Unknown      Problems Resolved During this Admission:       Patient is status post left tibial shaft open reduction internal fixation with a tibial nail.  He has been doing well.  Weightbearing as tolerated left lower extremity.  Participating with therapy.  Mild confusion at baseline.  Case management consulted for placement.  Recommend work with physical therapy pain control weightbearing as tolerated DVT prophylaxis, Lovenox discharge when ready.       This note/OR report was created with the assistance of  voice recognition software or phone  dictation.  There may be transcription errors as a result of using this technology however minimal. Effort has been made to assure accuracy of transcription but any obvious errors or omissions should be clarified with the author of the document.           Iron Davis PA-C  Orthopedic Trauma Surgery  [facility]

## 2023-10-30 LAB
ALBUMIN SERPL-MCNC: 3 G/DL (ref 3.4–4.8)
ALBUMIN/GLOB SERPL: 0.8 RATIO (ref 1.1–2)
ALP SERPL-CCNC: 57 UNIT/L (ref 40–150)
ALT SERPL-CCNC: 20 UNIT/L (ref 0–55)
AST SERPL-CCNC: 24 UNIT/L (ref 5–34)
BASOPHILS # BLD AUTO: 0.03 X10(3)/MCL
BASOPHILS NFR BLD AUTO: 0.6 %
BILIRUB SERPL-MCNC: 1.2 MG/DL
BUN SERPL-MCNC: 10.7 MG/DL (ref 8.4–25.7)
CALCIUM SERPL-MCNC: 9.2 MG/DL (ref 8.8–10)
CHLORIDE SERPL-SCNC: 101 MMOL/L (ref 98–107)
CO2 SERPL-SCNC: 29 MMOL/L (ref 23–31)
CREAT SERPL-MCNC: 0.7 MG/DL (ref 0.73–1.18)
CRP SERPL-MCNC: 60.9 MG/L
EOSINOPHIL # BLD AUTO: 0.13 X10(3)/MCL (ref 0–0.9)
EOSINOPHIL NFR BLD AUTO: 2.6 %
ERYTHROCYTE [DISTWIDTH] IN BLOOD BY AUTOMATED COUNT: 15 % (ref 11.5–17)
GFR SERPLBLD CREATININE-BSD FMLA CKD-EPI: >60 MLS/MIN/1.73/M2
GLOBULIN SER-MCNC: 3.6 GM/DL (ref 2.4–3.5)
GLUCOSE SERPL-MCNC: 94 MG/DL (ref 82–115)
HCT VFR BLD AUTO: 37.8 % (ref 42–52)
HGB BLD-MCNC: 12.5 G/DL (ref 14–18)
IMM GRANULOCYTES # BLD AUTO: 0.02 X10(3)/MCL (ref 0–0.04)
IMM GRANULOCYTES NFR BLD AUTO: 0.4 %
LYMPHOCYTES # BLD AUTO: 1.54 X10(3)/MCL (ref 0.6–4.6)
LYMPHOCYTES NFR BLD AUTO: 30.6 %
MCH RBC QN AUTO: 28 PG (ref 27–31)
MCHC RBC AUTO-ENTMCNC: 33.1 G/DL (ref 33–36)
MCV RBC AUTO: 84.8 FL (ref 80–94)
MONOCYTES # BLD AUTO: 0.83 X10(3)/MCL (ref 0.1–1.3)
MONOCYTES NFR BLD AUTO: 16.5 %
NEUTROPHILS # BLD AUTO: 2.49 X10(3)/MCL (ref 2.1–9.2)
NEUTROPHILS NFR BLD AUTO: 49.3 %
NRBC BLD AUTO-RTO: 0 %
PLATELET # BLD AUTO: 221 X10(3)/MCL (ref 130–400)
PMV BLD AUTO: 10.3 FL (ref 7.4–10.4)
POTASSIUM SERPL-SCNC: 3.8 MMOL/L (ref 3.5–5.1)
PREALB SERPL-MCNC: 12.5 MG/DL (ref 16–42)
PROT SERPL-MCNC: 6.6 GM/DL (ref 5.8–7.6)
RBC # BLD AUTO: 4.46 X10(6)/MCL (ref 4.7–6.1)
SODIUM SERPL-SCNC: 137 MMOL/L (ref 136–145)
WBC # SPEC AUTO: 5.04 X10(3)/MCL (ref 4.5–11.5)

## 2023-10-30 PROCEDURE — 85025 COMPLETE CBC W/AUTO DIFF WBC: CPT

## 2023-10-30 PROCEDURE — 25000003 PHARM REV CODE 250

## 2023-10-30 PROCEDURE — 63600175 PHARM REV CODE 636 W HCPCS

## 2023-10-30 PROCEDURE — 84134 ASSAY OF PREALBUMIN: CPT

## 2023-10-30 PROCEDURE — 80053 COMPREHEN METABOLIC PANEL: CPT

## 2023-10-30 PROCEDURE — 97530 THERAPEUTIC ACTIVITIES: CPT | Mod: CO

## 2023-10-30 PROCEDURE — 97535 SELF CARE MNGMENT TRAINING: CPT | Mod: CO

## 2023-10-30 PROCEDURE — 96372 THER/PROPH/DIAG INJ SC/IM: CPT

## 2023-10-30 PROCEDURE — 86140 C-REACTIVE PROTEIN: CPT

## 2023-10-30 PROCEDURE — G0378 HOSPITAL OBSERVATION PER HR: HCPCS

## 2023-10-30 PROCEDURE — 97116 GAIT TRAINING THERAPY: CPT | Mod: CQ

## 2023-10-30 RX ADMIN — POLYETHYLENE GLYCOL 3350 17 G: 17 POWDER, FOR SOLUTION ORAL at 08:10

## 2023-10-30 RX ADMIN — POLYETHYLENE GLYCOL 3350 17 G: 17 POWDER, FOR SOLUTION ORAL at 09:10

## 2023-10-30 RX ADMIN — GABAPENTIN 300 MG: 300 CAPSULE ORAL at 09:10

## 2023-10-30 RX ADMIN — Medication 6 MG: at 09:10

## 2023-10-30 RX ADMIN — METHOCARBAMOL 500 MG: 500 TABLET ORAL at 02:10

## 2023-10-30 RX ADMIN — OXYCODONE HYDROCHLORIDE 5 MG: 5 TABLET ORAL at 05:10

## 2023-10-30 RX ADMIN — GABAPENTIN 300 MG: 300 CAPSULE ORAL at 02:10

## 2023-10-30 RX ADMIN — METHOCARBAMOL 500 MG: 500 TABLET ORAL at 05:10

## 2023-10-30 RX ADMIN — METHOCARBAMOL 500 MG: 500 TABLET ORAL at 09:10

## 2023-10-30 RX ADMIN — GABAPENTIN 300 MG: 300 CAPSULE ORAL at 08:10

## 2023-10-30 RX ADMIN — ENOXAPARIN SODIUM 40 MG: 40 INJECTION SUBCUTANEOUS at 09:10

## 2023-10-30 RX ADMIN — DOCUSATE SODIUM 100 MG: 100 CAPSULE, LIQUID FILLED ORAL at 09:10

## 2023-10-30 RX ADMIN — ENOXAPARIN SODIUM 40 MG: 40 INJECTION SUBCUTANEOUS at 08:10

## 2023-10-30 RX ADMIN — DOCUSATE SODIUM 100 MG: 100 CAPSULE, LIQUID FILLED ORAL at 08:10

## 2023-10-30 NOTE — PT/OT/SLP PROGRESS
Occupational Therapy   Treatment    Name: Artemio Ramsey  MRN: [id number]  Admitting Diagnosis:  Tibia/fibula fracture, left, closed, initial encounter  5 Days Post-Op    Recommendations:     Recommended therapy intensity at discharge: High Intensity Therapy   Discharge Equipment Recommendations:  bath bench, walker, rolling  Barriers to discharge:       Assessment:     Artemio Ramsey is a 67 y.o. male with a medical diagnosis of Tibia/fibula fracture, left, closed, initial encounter. Performance deficits affecting function are weakness, gait instability, impaired endurance, impaired balance, decreased lower extremity function, decreased safety awareness, impaired functional mobility, impaired self care skills, pain.     Rehab Prognosis:  Good; patient would benefit from acute skilled OT services to address these deficits and reach maximum level of function.       Plan:     Patient to be seen 3 x/week to address the above listed problems via self-care/home management, therapeutic activities, therapeutic exercises  Plan of Care Expires: 11/23/23  Plan of Care Reviewed with: patient    Subjective     Pain/Comfort:  Location - Side 1: Left  Location 1: leg  Pain Addressed 1: Reposition, Distraction    Objective:     Communicated with: RN prior to session.  Patient found supine with telemetry upon OT entry to room.    General Precautions: Standard, fall    Orthopedic Precautions:LLE weight bearing as tolerated  Braces: N/A  Respiratory Status: Room air     Occupational Performance:     Bed Mobility:    Patient completed Supine to Sit with minimum assistance     Functional Mobility/Transfers:  Patient completed Sit <> Stand Transfer with minimum assistance and moderate assistance  with  rolling walker   Patient completed Bed <> Chair Transfer using Step Transfer technique with minimum assistance with rolling walker    Activities of Daily Living:  Grooming: pt completed oral hygiene standing at sink with Min A for standing  balance and task completion. Difficulty letting go of RW to use UE for tasks.    Therapeutic Positioning    OT interventions performed during the course of today's session in an effort to prevent and/or reduce acquired pressure injuries:   Education was provided on benefits of and recommendations for therapeutic positioning    Skin assessment: all bony prominences were assessed    Findings: no redness or breakdown noted    [facility] 6 Click ADL: 17    Patient Education:  Patient provided with verbal education education regarding OT role/goals/POC, post op precautions, fall prevention, and safety awareness.  Understanding was verbalized, however additional teaching warranted.      Patient left up in chair with all lines intact, call button in reach, chair alarm on, and RN notified    GOALS:   Multidisciplinary Problems       Occupational Therapy Goals          Problem: Occupational Therapy    Goal Priority Disciplines Outcome Interventions   Occupational Therapy Goal     OT, PT/OT Ongoing, Progressing    Description: STG: to be met by 11/26/23    Pt will complete grooming standing at sink with LRAD with SBA.  Pt will complete UB dressing independently while seated.  Pt will complete LB dressing with SBA using LRAD and AE prn.   Pt will complete toileting with SBA using LRAD.  Pt will complete functional mobility to/from toilet and toilet transfer with SBA using LRAD.                        Time Tracking:     OT Date of Treatment: 10/30/23  OT Start Time: 0910  OT Stop Time: 0934  OT Total Time (min): 24 min    Billable Minutes:Self Care/Home Management 10  Therapeutic Activity 13    OT/JOCELYNN: JOCELYNN     Number of JOCELYNN visits since last OT visit: 2    10/30/2023

## 2023-10-30 NOTE — PROGRESS NOTES
"Inpatient Nutrition Evaluation    Admit Date: 10/24/2023   Total duration of encounter: 6 days    Nutrition Recommendation/Prescription     - continue heart healthy diet   - boost plus with meals; 360kcal, 14 gm protein per container    Nutrition Assessment     Chart Review    Reason Seen: length of stay    Malnutrition Screening Tool Results   Have you recently lost weight without trying?: No  Have you been eating poorly because of a decreased appetite?: No   MST Score: 0     Diagnosis:  Left tibia/fibula comminuted fracture     Relevant Medical History: HLD    Nutrition-Related Medications: docusate, miralax    Nutrition-Related Labs:  10/30: H/H 12.5/37.8, Crea 0.7, PAB 12.5, CRP 60.9    Diet Order: Diet heart healthy  Oral Supplement Order: Boost Plus  Appetite/Oral Intake: fair/50-75% of meals  Factors Affecting Nutritional Intake: none identified  Food/Episcopal/Cultural Preferences: none reported  Food Allergies: no known food allergies    Skin Integrity: incision  Wound(s):       Comments    10/30: pt reports pretty good appetite and intake of meals, no prior appetite or weight loss    Anthropometrics    Height: 5' 11" (180.3 cm)    Last Weight: 69.9 kg (154 lb) (10/25/23 0245) Weight Method: Bed Scale  BMI (Calculated): 21.5  BMI Classification: normal (BMI 18.5-24.9)        Ideal Body Weight (IBW), Male: 172 lb     % Ideal Body Weight, Male (lb): 89.53 %                          Usual Weight Provided By: patient denies unintentional weight loss    Wt Readings from Last 5 Encounters:   10/25/23 69.9 kg (154 lb)     Weight Change(s) Since Admission:  Admit Weight: 68 kg (150 lb) (10/24/23 1414)      Patient Education    Not applicable.    Monitoring & Evaluation     Dietitian will monitor food and beverage intake and weight change.  Nutrition Risk/Follow-Up: low (follow-up in 5-7 days)  Patients assigned 'low nutrition risk' status do not qualify for a full nutritional assessment but will be monitored and " re-evaluated in a 5-7 day time period. Please consult if re-evaluation needed sooner.

## 2023-10-30 NOTE — PT/OT/SLP PROGRESS
Physical Therapy Treatment    Patient Name:  Artemio Ramsey   MRN:  [id number]    Recommendations:     Discharge therapy intensity: high intensity   Discharge Equipment Recommendations: walker, rolling, bath bench  Barriers to discharge: Impaired mobility    Assessment:     Artemio Ramsey is a 67 y.o. male admitted with a medical diagnosis of Tibia/fibula fracture, left, closed, initial encounter.  He presents with the following impairments/functional limitations: weakness, decreased ROM, impaired endurance, decreased safety awareness, impaired functional mobility, orthopedic precautions, pain pt was able to ambulate further today with call before fall educated before departing.    Rehab Prognosis: Good; patient would benefit from acute skilled PT services to address these deficits and reach maximum level of function.    Recent Surgery: Procedure(s) (LRB):  INSERTION, INTRAMEDULLARY LOPEZ, TIBIA (Left) 5 Days Post-Op    Plan:     During this hospitalization, patient to be seen 6 x/week to address the identified rehab impairments via gait training and progress toward the following goals:    Plan of Care Expires:  11/26/23    Subjective     Chief Complaint: n/a  Patient/Family Comments/goals: to get back to PLOF  Pain/Comfort:  Pain Rating 1: 0/10      Objective:     Communicated with nurse prior to session.  Patient found HOB elevated with   upon PT entry to room.     General Precautions: Standard, fall  Orthopedic Precautions: LLE weight bearing as tolerated  Braces: N/A  Respiratory Status: Room air  Skin Integrity: Visible skin intact      Functional Mobility:  Bed Mobility:     Supine to Sit: stand by assistance and with HOB elevated  Transfers:     Sit to Stand:  minimum assistance with rolling walker and from EOB, with increased time noted to stand erect  Gait: pt amb 60ft with RW- min A given chair in tow for safety, pt presented with absent heel strike on LLE, cueing given to correct however pt unable to perform a  this time, with slow tushar and short step length, no LOB noted    Therapeutic Activities/Exercises:  Sitting therex LLE 10x- ankle pumps, marches, LAQ, knee curls, hip abd/add    Education:  Patient provided with verbal education education regarding mobility.  Understanding was verbalized.     Patient left up in chair with call button in reach, chair alarm on, and friend present..    GOALS:   Multidisciplinary Problems       Physical Therapy Goals          Problem: Physical Therapy    Goal Priority Disciplines Outcome Goal Variances Interventions   Physical Therapy Goal     PT, PT/OT Ongoing, Progressing     Description: Goals to be met by: 23     Patient will increase functional independence with mobility by performin. Supine to sit with [place]  2. Sit to supine with [place]  3. Sit to stand transfer with Modified [place]  4. Gait  x 200 feet with Modified [place] using Rolling Walker.   5. Ascend/descend 4 stairs with no Handrails & Stand-by Assistance                         Time Tracking:     PT Received On:    PT Start Time: 1308     PT Stop Time: 1326  PT Total Time (min): 18 min     Billable Minutes: Gait Training 18    Treatment Type: Treatment  PT/PTA: PTA     Number of PTA visits since last PT visit: 3     10/30/2023

## 2023-10-30 NOTE — PROGRESS NOTES
"[facility] Neuro  Orthopedics  Progress Note    Patient Name: Artemio Ramsey  MRN: [id number]  Admission Date: 10/24/2023  Hospital Length of Stay: 1 days  Attending Provider: Jose Ramos DO  Primary Care Provider: Cherelle, Primary Doctor  Follow-up For: Procedure(s) (LRB):  INSERTION, INTRAMEDULLARY LOPEZ, TIBIA (Left)    Post-Operative Day: 5 Days Post-Op  Subjective:     Principal Problem:Tibia/fibula fracture, left, closed, initial encounter    Principal Orthopedic Problem: 5 Days Post-Op   Left tibial nail    Interval History:  Patient doing well states his pain is well controlled.  He is awake in bed, alert.  He is mobilizing with therapy. At baseline mental status this morning. No acute complaints. No numbness or tingling    Review of patient's allergies indicates:  No Known Allergies    Current Facility-Administered Medications   Medication    docusate sodium capsule 100 mg    enoxaparin injection 40 mg    gabapentin capsule 300 mg    HYDROmorphone (PF) injection 1 mg    influenza 65up-adj (QUADRIVALENT ADJUVANTED PF) vaccine 0.5 mL    magnesium hydroxide 400 mg/5 ml suspension 2,400 mg    melatonin tablet 6 mg    methocarbamoL tablet 500 mg    oxyCODONE immediate release tablet 5 mg    oxyCODONE immediate release tablet Tab 10 mg    pneumoc 20-evelio conj-dip cr(PF) (PREVNAR-20 (PF)) injection Syrg 0.5 mL    polyethylene glycol packet 17 g    sodium chloride 0.9% flush 10 mL     Objective:     Vital Signs (Most Recent):  Temp: 98.2 °F (36.8 °C) (10/30/23 0728)  Pulse: (!) 51 (10/30/23 0728)  Resp: 18 (10/30/23 0532)  BP: 102/62 (10/30/23 0728)  SpO2: 95 % (10/30/23 0728) Vital Signs (24h Range):  Temp:  [97.7 °F (36.5 °C)-98.2 °F (36.8 °C)] 98.2 °F (36.8 °C)  Pulse:  [49-62] 51  Resp:  [18-19] 18  SpO2:  [94 %-98 %] 95 %  BP: (102-152)/(62-87) 102/62     Weight: 69.9 kg (154 lb)  Height: 5' 11" (180.3 cm)  Body mass index is 21.48 kg/m².      Intake/Output Summary (Last 24 hours) at 10/30/2023 " 0752  Last data filed at 10/30/2023 0300  Gross per 24 hour   Intake --   Output 750 ml   Net -750 ml         Physical Exam:     General the patient is alert  no acute distress nontoxic-appearing appropriate affect.    Constitutional: Vital signs are examined and stable.  Resp: No signs of labored breathing    LLE: -Skin: Dressing CDI, No signs of new abrasions or lacerations, no scars           -MSK: EHL/FHL, Gastroc/Tib anterior Strength 5/5           -Neuro:  Sensation intact to light touch L3-S1 dermatomes           -Lymphatic: No signs of lymphadenopathy           -CV: Capillary refill is less than 2 seconds. DP/PT pulses 2/4. Compartments soft and compressible                          Diagnostic Findings:   Significant Labs:   Recent Lab Results         10/30/23  0432   10/29/23  0548   10/29/23  0526   10/28/23  0714        Albumin/Globulin Ratio 0.8     0.8   1.0       Albumin 3.0     2.9   2.9       ALP 57     56   54       ALT 20     20   22       AST 24     29   34       Baso # 0.03   0.02     0.03       Basophil % 0.6   0.4     0.6       BILIRUBIN TOTAL 1.2     1.1   1.0       BUN 10.7     14.3   11.3       Calcium 9.2     9.5   8.8       Chloride 101     101   102       CO2 29     32   29       Creatinine 0.70     0.70   0.71       CRP 60.90             eGFR >60     >60   >60       Eos # 0.13   0.17     0.17       Eosinophil % 2.6   3.2     3.4       Globulin, Total 3.6     3.6   2.9       Glucose 94     94   108       Hematocrit 37.8   36.5     35.0       Hemoglobin 12.5   12.0     11.5       Immature Grans (Abs) 0.02   0.02     0.02       Immature Granulocytes 0.4   0.4     0.4       Lymph # 1.54   1.60     1.35       LYMPH % 30.6   30.0     26.7       MCH 28.0   28.2     27.8       MCHC 33.1   32.9     32.9       MCV 84.8   85.9     84.7       Mono # 0.83   0.79     0.78       Mono % 16.5   14.8     15.4       MPV 10.3   10.7     11.3       Neut # 2.49   2.73     2.71       Neut % 49.3   51.2      53.5       nRBC 0.0   0.0     0.0       Platelet Count 221   214     207       Potassium 3.8     3.7   4.1       Prealbumin 12.5             PROTEIN TOTAL 6.6     6.5   5.8       RBC 4.46   4.25     4.13       RDW 15.0   15.4     15.6       Sodium 137     138   138       WBC 5.04   5.33     5.06                Significant Imaging: I have reviewed all pertinent imaging results/findings.    Assessment/Plan:     Active Diagnoses:    Diagnosis Date Noted POA    PRINCIPAL PROBLEM:  Tibia/fibula fracture, left, closed, initial encounter [S82.202A, S82.402A] 10/24/2023 Yes    Trauma [T14.90XA] 10/25/2023 Unknown      Problems Resolved During this Admission:       Patient is status post left tibial shaft open reduction internal fixation with a tibial nail.  He has been doing well.  Weightbearing as tolerated left lower extremity.  Participating with therapy.  Mild confusion at baseline.    Continue lovenox for DVT ppx during stay followed by aspirin 81 mg BID at discharge.   Continue multimodal pain control  Daily dry dressing changes beginning today, may leave open to air if no drainage. May begin showers POD 7. No ointments or creams  Follow up with Ashlie Gilbert in approx 3 weeks for wound check and repeat x rays.     The above findings, diagnostics, and treatment plan were discussed with Dr. Ramos who is in agreement with the plan of care except as stated in additional documentation.     Ashlie Gilbert PA-C  [facility]   Orthopedic Trauma

## 2023-10-30 NOTE — PLAN OF CARE
Mathieu with [facility] [employer] came today and met with pt. She is also going to the home. .

## 2023-10-31 LAB
ALBUMIN SERPL-MCNC: 2.9 G/DL (ref 3.4–4.8)
ALBUMIN/GLOB SERPL: 1 RATIO (ref 1.1–2)
ALP SERPL-CCNC: 57 UNIT/L (ref 40–150)
ALT SERPL-CCNC: 19 UNIT/L (ref 0–55)
AST SERPL-CCNC: 21 UNIT/L (ref 5–34)
BASOPHILS # BLD AUTO: 0.02 X10(3)/MCL
BASOPHILS NFR BLD AUTO: 0.4 %
BILIRUB SERPL-MCNC: 1 MG/DL
BUN SERPL-MCNC: 13.3 MG/DL (ref 8.4–25.7)
CALCIUM SERPL-MCNC: 8.8 MG/DL (ref 8.8–10)
CHLORIDE SERPL-SCNC: 102 MMOL/L (ref 98–107)
CO2 SERPL-SCNC: 29 MMOL/L (ref 23–31)
CREAT SERPL-MCNC: 0.69 MG/DL (ref 0.73–1.18)
EOSINOPHIL # BLD AUTO: 0.1 X10(3)/MCL (ref 0–0.9)
EOSINOPHIL NFR BLD AUTO: 2 %
ERYTHROCYTE [DISTWIDTH] IN BLOOD BY AUTOMATED COUNT: 14.9 % (ref 11.5–17)
GFR SERPLBLD CREATININE-BSD FMLA CKD-EPI: >60 MLS/MIN/1.73/M2
GLOBULIN SER-MCNC: 2.9 GM/DL (ref 2.4–3.5)
GLUCOSE SERPL-MCNC: 96 MG/DL (ref 82–115)
HCT VFR BLD AUTO: 34.2 % (ref 42–52)
HGB BLD-MCNC: 11.3 G/DL (ref 14–18)
IMM GRANULOCYTES # BLD AUTO: 0.01 X10(3)/MCL (ref 0–0.04)
IMM GRANULOCYTES NFR BLD AUTO: 0.2 %
LYMPHOCYTES # BLD AUTO: 1.75 X10(3)/MCL (ref 0.6–4.6)
LYMPHOCYTES NFR BLD AUTO: 35 %
MCH RBC QN AUTO: 27.8 PG (ref 27–31)
MCHC RBC AUTO-ENTMCNC: 33 G/DL (ref 33–36)
MCV RBC AUTO: 84.2 FL (ref 80–94)
MONOCYTES # BLD AUTO: 0.88 X10(3)/MCL (ref 0.1–1.3)
MONOCYTES NFR BLD AUTO: 17.6 %
NEUTROPHILS # BLD AUTO: 2.24 X10(3)/MCL (ref 2.1–9.2)
NEUTROPHILS NFR BLD AUTO: 44.8 %
NRBC BLD AUTO-RTO: 0 %
PLATELET # BLD AUTO: 260 X10(3)/MCL (ref 130–400)
PMV BLD AUTO: 11 FL (ref 7.4–10.4)
POTASSIUM SERPL-SCNC: 3.9 MMOL/L (ref 3.5–5.1)
PROT SERPL-MCNC: 5.8 GM/DL (ref 5.8–7.6)
RBC # BLD AUTO: 4.06 X10(6)/MCL (ref 4.7–6.1)
SODIUM SERPL-SCNC: 138 MMOL/L (ref 136–145)
WBC # SPEC AUTO: 5 X10(3)/MCL (ref 4.5–11.5)

## 2023-10-31 PROCEDURE — G0378 HOSPITAL OBSERVATION PER HR: HCPCS

## 2023-10-31 PROCEDURE — 93005 ELECTROCARDIOGRAM TRACING: CPT

## 2023-10-31 PROCEDURE — 97116 GAIT TRAINING THERAPY: CPT | Mod: CQ

## 2023-10-31 PROCEDURE — 96372 THER/PROPH/DIAG INJ SC/IM: CPT

## 2023-10-31 PROCEDURE — 97530 THERAPEUTIC ACTIVITIES: CPT | Mod: CQ

## 2023-10-31 PROCEDURE — 85025 COMPLETE CBC W/AUTO DIFF WBC: CPT

## 2023-10-31 PROCEDURE — 25000003 PHARM REV CODE 250

## 2023-10-31 PROCEDURE — 80053 COMPREHEN METABOLIC PANEL: CPT

## 2023-10-31 PROCEDURE — 63600175 PHARM REV CODE 636 W HCPCS

## 2023-10-31 PROCEDURE — 97535 SELF CARE MNGMENT TRAINING: CPT | Mod: CO

## 2023-10-31 RX ADMIN — DOCUSATE SODIUM 100 MG: 100 CAPSULE, LIQUID FILLED ORAL at 09:10

## 2023-10-31 RX ADMIN — ENOXAPARIN SODIUM 40 MG: 40 INJECTION SUBCUTANEOUS at 09:10

## 2023-10-31 RX ADMIN — METHOCARBAMOL 500 MG: 500 TABLET ORAL at 02:10

## 2023-10-31 RX ADMIN — DOCUSATE SODIUM 100 MG: 100 CAPSULE, LIQUID FILLED ORAL at 08:10

## 2023-10-31 RX ADMIN — OXYCODONE HYDROCHLORIDE 5 MG: 5 TABLET ORAL at 12:10

## 2023-10-31 RX ADMIN — OXYCODONE HYDROCHLORIDE 10 MG: 10 TABLET ORAL at 09:10

## 2023-10-31 RX ADMIN — GABAPENTIN 300 MG: 300 CAPSULE ORAL at 08:10

## 2023-10-31 RX ADMIN — POLYETHYLENE GLYCOL 3350 17 G: 17 POWDER, FOR SOLUTION ORAL at 09:10

## 2023-10-31 RX ADMIN — METHOCARBAMOL 500 MG: 500 TABLET ORAL at 05:10

## 2023-10-31 RX ADMIN — METHOCARBAMOL 500 MG: 500 TABLET ORAL at 09:10

## 2023-10-31 RX ADMIN — GABAPENTIN 300 MG: 300 CAPSULE ORAL at 02:10

## 2023-10-31 RX ADMIN — POLYETHYLENE GLYCOL 3350 17 G: 17 POWDER, FOR SOLUTION ORAL at 08:10

## 2023-10-31 RX ADMIN — GABAPENTIN 300 MG: 300 CAPSULE ORAL at 09:10

## 2023-10-31 RX ADMIN — ENOXAPARIN SODIUM 40 MG: 40 INJECTION SUBCUTANEOUS at 08:10

## 2023-10-31 NOTE — PT/OT/SLP PROGRESS
Occupational Therapy   Treatment    Name: Artemio Ramsey  MRN: [id number]  Admitting Diagnosis:  Tibia/fibula fracture, left, closed, initial encounter  6 Days Post-Op    Recommendations:     Recommended therapy intensity at discharge: High Intensity Therapy   Discharge Equipment Recommendations:  bath bench, walker, rolling  Barriers to discharge:       Assessment:     Artemio Ramsey is a 67 y.o. male with a medical diagnosis of Tibia/fibula fracture, left, closed, initial encounter. Performance deficits affecting function are weakness, gait instability, impaired endurance, impaired balance, decreased lower extremity function, decreased safety awareness, impaired self care skills, impaired functional mobility.     Rehab Prognosis:  Good; patient would benefit from acute skilled OT services to address these deficits and reach maximum level of function.       Plan:     Patient to be seen 3 x/week to address the above listed problems via self-care/home management, therapeutic exercises, therapeutic activities  Plan of Care Expires: 11/23/23  Plan of Care Reviewed with: patient    Subjective     Pain/Comfort:  Pain Rating 1: 0/10    Objective:     Communicated with: RN prior to session.  Patient found supine with peripheral IV upon OT entry to room.    General Precautions: Standard, fall    Orthopedic Precautions:LLE weight bearing as tolerated  Braces: N/A  Respiratory Status: Room air     Occupational Performance:     Bed Mobility:    Patient completed Supine to Sit with stand by assistance  Patient completed Sit to Supine with stand by assistance     Functional Mobility/Transfers:  Patient completed Sit <> Stand Transfer with minimum assistance  with  rolling walker x6 trials from EOB.  Functional Mobility: walked to bathroom with Min A and RW. No LOB noted.     Activities of Daily Living:  Toileting: performed toilet t/f with Min A.  LE dressing: donned socks seated EOB with CGA when stooping down to feet. Extended time  needed. Unable to achieve figure 4 position.   UE dressing: donned/doffed gown with set-up A.     Therapeutic Positioning    OT interventions performed during the course of today's session in an effort to prevent and/or reduce acquired pressure injuries:   Education was provided on benefits of and recommendations for therapeutic positioning    Skin assessment: all bony prominences were assessed    Findings: no redness or breakdown noted    AMPAC 6 Click ADL: 19    Patient Education:  Patient provided with verbal education education regarding OT role/goals/POC and safety awareness.  Understanding was verbalized, however additional teaching warranted.      Patient left supine with all lines intact and call button in reach    GOALS:   Multidisciplinary Problems       Occupational Therapy Goals          Problem: Occupational Therapy    Goal Priority Disciplines Outcome Interventions   Occupational Therapy Goal     OT, PT/OT Ongoing, Progressing    Description: STG: to be met by 11/26/23    Pt will complete grooming standing at sink with LRAD with SBA.  Pt will complete UB dressing independently while seated.  Pt will complete LB dressing with SBA using LRAD and AE prn.   Pt will complete toileting with SBA using LRAD.  Pt will complete functional mobility to/from toilet and toilet transfer with SBA using LRAD.                        Time Tracking:     OT Date of Treatment: 10/31/23  OT Start Time: 1327  OT Stop Time: 1350  OT Total Time (min): 23 min    Billable Minutes:Self Care/Home Management 23    OT/JOCELYNN: JOCELYNN     Number of JOCELYNN visits since last OT visit: 3    10/31/2023

## 2023-10-31 NOTE — PROGRESS NOTES
"[facility] Neuro  Orthopedics  Progress Note    Patient Name: Artemio Ramsey  MRN: [id number]  Admission Date: 10/24/2023  Hospital Length of Stay: 1 days  Attending Provider: Jose Ramos DO  Primary Care Provider: Cherelle, Primary Doctor  Follow-up For: Procedure(s) (LRB):  INSERTION, INTRAMEDULLARY LOPEZ, TIBIA (Left)    Post-Operative Day: 6 Days Post-Op  Subjective:     Principal Problem:Tibia/fibula fracture, left, closed, initial encounter    Principal Orthopedic Problem: 6 Days Post-Op   Left tibial nail    Interval History:  Patient doing well states his pain is well controlled.  He is awake in bed, alert.  He is mobilizing with therapy. At baseline mental status. No numbness or tingling. Awaiting placement    Review of patient's allergies indicates:  No Known Allergies    Current Facility-Administered Medications   Medication    docusate sodium capsule 100 mg    enoxaparin injection 40 mg    gabapentin capsule 300 mg    HYDROmorphone (PF) injection 1 mg    influenza 65up-adj (QUADRIVALENT ADJUVANTED PF) vaccine 0.5 mL    magnesium hydroxide 400 mg/5 ml suspension 2,400 mg    melatonin tablet 6 mg    methocarbamoL tablet 500 mg    oxyCODONE immediate release tablet 5 mg    oxyCODONE immediate release tablet Tab 10 mg    pneumoc 20-evelio conj-dip cr(PF) (PREVNAR-20 (PF)) injection Syrg 0.5 mL    polyethylene glycol packet 17 g    sodium chloride 0.9% flush 10 mL     Objective:     Vital Signs (Most Recent):  Temp: 97.9 °F (36.6 °C) (10/31/23 0730)  Pulse: (!) 50 (10/31/23 0730)  Resp: 14 (10/30/23 2330)  BP: 120/73 (10/31/23 0730)  SpO2: 98 % (10/31/23 0730) Vital Signs (24h Range):  Temp:  [97 °F (36.1 °C)-98 °F (36.7 °C)] 97.9 °F (36.6 °C)  Pulse:  [50-64] 50  Resp:  [14] 14  SpO2:  [97 %-99 %] 98 %  BP: (111-128)/(68-75) 120/73     Weight: 69.9 kg (154 lb)  Height: 5' 11" (180.3 cm)  Body mass index is 21.48 kg/m².      Intake/Output Summary (Last 24 hours) at 10/31/2023 3160  Last data filed " at 10/31/2023 0300  Gross per 24 hour   Intake 440 ml   Output 1650 ml   Net -1210 ml         Physical Exam:     General the patient is alert  no acute distress nontoxic-appearing appropriate affect.    Constitutional: Vital signs are examined and stable.  Resp: No signs of labored breathing    LLE: -Skin: Dressing CDI, No signs of new abrasions or lacerations, no scars           -MSK: EHL/FHL, Gastroc/Tib anterior Strength 5/5           -Neuro:  Sensation intact to light touch L3-S1 dermatomes           -Lymphatic: No signs of lymphadenopathy           -CV: Capillary refill is less than 2 seconds. DP/PT pulses 2/4. Compartments soft and compressible                          Diagnostic Findings:   Significant Labs:   Recent Lab Results         10/31/23  0432   10/30/23  0432   10/29/23  0548   10/29/23  0526        Albumin/Globulin Ratio 1.0   0.8     0.8       Albumin 2.9   3.0     2.9       ALP 57   57     56       ALT 19   20     20       AST 21   24     29       Baso # 0.02   0.03   0.02         Basophil % 0.4   0.6   0.4         BILIRUBIN TOTAL 1.0   1.2     1.1       BUN 13.3   10.7     14.3       Calcium 8.8   9.2     9.5       Chloride 102   101     101       CO2 29   29     32       Creatinine 0.69   0.70     0.70       CRP   60.90           eGFR >60   >60     >60       Eos # 0.10   0.13   0.17         Eosinophil % 2.0   2.6   3.2         Globulin, Total 2.9   3.6     3.6       Glucose 96   94     94       Hematocrit 34.2   37.8   36.5         Hemoglobin 11.3   12.5   12.0         Immature Grans (Abs) 0.01   0.02   0.02         Immature Granulocytes 0.2   0.4   0.4         Lymph # 1.75   1.54   1.60         LYMPH % 35.0   30.6   30.0         MCH 27.8   28.0   28.2         MCHC 33.0   33.1   32.9         MCV 84.2   84.8   85.9         Mono # 0.88   0.83   0.79         Mono % 17.6   16.5   14.8         MPV 11.0   10.3   10.7         Neut # 2.24   2.49   2.73         Neut % 44.8   49.3   51.2         nRBC  0.0   0.0   0.0         Platelet Count 260   221   214         Potassium 3.9   3.8     3.7       Prealbumin   12.5           PROTEIN TOTAL 5.8   6.6     6.5       RBC 4.06   4.46   4.25         RDW 14.9   15.0   15.4         Sodium 138   137     138       WBC 5.00   5.04   5.33                  Significant Imaging: I have reviewed all pertinent imaging results/findings.    Assessment/Plan:     Active Diagnoses:    Diagnosis Date Noted POA    PRINCIPAL PROBLEM:  Tibia/fibula fracture, left, closed, initial encounter [S82.202A, S82.402A] 10/24/2023 Yes    Trauma [T14.90XA] 10/25/2023 Unknown      Problems Resolved During this Admission:     H&H down slightly but stable  Patient is status post left tibial shaft open reduction internal fixation with a tibial nail.  He has been doing well.  Weightbearing as tolerated left lower extremity.  Participating with therapy.  Mild confusion at baseline- unchanged  Continue lovenox for DVT ppx during stay followed by aspirin 81 mg BID at discharge.   Continue multimodal pain control  Daily dry dressing changes; may leave open to air if no drainage. May begin showers POD 7. No ointments or creams  Follow up with Ashlie Gilbert in approx 3 weeks for wound check and repeat x rays.   Stable for discharge when ready. Please reach out and orders will be placed at that time.   Consulted medicine for evaluation of his bradycardia.    The above findings, diagnostics, and treatment plan were discussed with Dr. Ramos who is in agreement with the plan of care except as stated in additional documentation.     Alexandra Blair Lemaire, PA-C [facility]   Orthopedic Trauma

## 2023-10-31 NOTE — PT/OT/SLP PROGRESS
Physical Therapy Treatment    Patient Name:  Artemio Ramsey   MRN:  [id number]    Recommendations:     Discharge therapy intensity: high intensity   Discharge Equipment Recommendations: walker, rolling, bath bench  Barriers to discharge: Impaired mobility    Assessment:     Artemio Ramsey is a 67 y.o. male admitted with a medical diagnosis of Tibia/fibula fracture, left, closed, initial encounter.  He presents with the following impairments/functional limitations: weakness, decreased ROM, impaired endurance, pain, orthopedic precautions, impaired functional mobility pt was able to ambulate further today with one sitting rest break, pt was able to perform heel strike better on LLE during 2nd gait trial.    Rehab Prognosis: Good; patient would benefit from acute skilled PT services to address these deficits and reach maximum level of function.    Recent Surgery: Procedure(s) (LRB):  INSERTION, INTRAMEDULLARY LOPEZ, TIBIA (Left) 6 Days Post-Op    Plan:     During this hospitalization, patient to be seen 6 x/week to address the identified rehab impairments via gait training, therapeutic activities and progress toward the following goals:    Plan of Care Expires:  11/26/23    Subjective     Chief Complaint: needing to try and use restroom, however unsuccessful both times  Patient/Family Comments/goals: to get back to PLOF  Pain/Comfort:  Pain Rating 1: 0/10      Objective:     Communicated with nurse prior to session.  Patient found  on BSC  with   upon PT entry to room.     General Precautions: Standard, fall  Orthopedic Precautions: LLE weight bearing as tolerated  Braces: N/A  Respiratory Status: Room air  Skin Integrity: Visible skin intact      Functional Mobility:  Transfers:     Sit to Stand:  minimum assistance with rolling walker and from EOB  Toilet Transfer: minimum assistance with  rolling walker  using  Step Transfer and t/f from bedside chair<>BSC with a step to gait  Gait: pt amb 60ft then 40ft with RW- min A, pt  noted with absent heel strike on 1s trial, with cueing given throughout 2nd trial in order to perform heel strike    Education:  Patient provided with verbal education education regarding mobility with better gait quality.  Understanding was verbalized, however additional teaching warranted.     Patient left up in chair with call button in reach, chair alarm on, and call before fall educated ..    GOALS:   Multidisciplinary Problems       Physical Therapy Goals          Problem: Physical Therapy    Goal Priority Disciplines Outcome Goal Variances Interventions   Physical Therapy Goal     PT, PT/OT Ongoing, Progressing     Description: Goals to be met by: 23     Patient will increase functional independence with mobility by performin. Supine to sit with [place]  2. Sit to supine with [place]  3. Sit to stand transfer with Modified [place]  4. Gait  x 200 feet with Modified [place] using Rolling Walker.   5. Ascend/descend 4 stairs with no Handrails & Stand-by Assistance                         Time Tracking:     PT Received On:    PT Start Time: 903     PT Stop Time: 929  PT Total Time (min): 26 min     Billable Minutes: Gait Training 15 and Therapeutic Activity 11    Treatment Type: Treatment  PT/PTA: PTA     Number of PTA visits since last PT visit: 4     10/31/2023

## 2023-10-31 NOTE — PLAN OF CARE
Two issues going on at present. Mathieu with the [employer] [phone] doing her assessment with pt and family . I am not privy to what facilitated this as it was initiated prior to admit. She asks that I keep her updated to what is recommended etc. We discussed today therapy says high intensity therapy.   She is also aware son tommy silver called wanting to know what was going on with pts care, that he needs a note sent to his boss  Mark Can  stating that he was at the hospital Wednesday Nov 25,26,27 and 30. Morena number is . Contacted Mark and this done.   Tommy tells me that he wants home health for his father and his mother amando. Advised that he may need to work with Dilcia cloud dr as she is not a patient.  Spoke with pt about therapy rec. He wants to try skilled at [facility]. I called [facility] who tells me they are not in network.   Pts second choice was [facility]. Referral sent.  FOC in chart.   Will follow referral results.

## 2023-11-01 LAB
ALBUMIN SERPL-MCNC: 2.9 G/DL (ref 3.4–4.8)
ALBUMIN/GLOB SERPL: 0.9 RATIO (ref 1.1–2)
ALP SERPL-CCNC: 59 UNIT/L (ref 40–150)
ALT SERPL-CCNC: 25 UNIT/L (ref 0–55)
AST SERPL-CCNC: 27 UNIT/L (ref 5–34)
AV INDEX (PROSTH): 0.86
AV MEAN GRADIENT: 3 MMHG
AV PEAK GRADIENT: 6 MMHG
AV VELOCITY RATIO: 0.77
BASOPHILS # BLD AUTO: 0.03 X10(3)/MCL
BASOPHILS NFR BLD AUTO: 0.6 %
BILIRUB SERPL-MCNC: 0.9 MG/DL
BSA FOR ECHO PROCEDURE: 1.87 M2
BUN SERPL-MCNC: 16.6 MG/DL (ref 8.4–25.7)
CALCIUM SERPL-MCNC: 8.8 MG/DL (ref 8.8–10)
CHLORIDE SERPL-SCNC: 103 MMOL/L (ref 98–107)
CO2 SERPL-SCNC: 27 MMOL/L (ref 23–31)
CREAT SERPL-MCNC: 0.75 MG/DL (ref 0.73–1.18)
CV ECHO LV RWT: 0.52 CM
DOP CALC AO PEAK VEL: 1.21 M/S
DOP CALC AO VTI: 23.6 CM
DOP CALC LVOT PEAK VEL: 0.93 M/S
DOP CALCLVOT PEAK VEL VTI: 20.2 CM
E WAVE DECELERATION TIME: 169 MSEC
E/A RATIO: 0.93
E/E' RATIO: 10.63 M/S
ECHO LV POSTERIOR WALL: 1.04 CM (ref 0.6–1.1)
EOSINOPHIL # BLD AUTO: 0.12 X10(3)/MCL (ref 0–0.9)
EOSINOPHIL NFR BLD AUTO: 2.4 %
ERYTHROCYTE [DISTWIDTH] IN BLOOD BY AUTOMATED COUNT: 15.1 % (ref 11.5–17)
FRACTIONAL SHORTENING: 35 % (ref 28–44)
GFR SERPLBLD CREATININE-BSD FMLA CKD-EPI: >60 MLS/MIN/1.73/M2
GLOBULIN SER-MCNC: 3.1 GM/DL (ref 2.4–3.5)
GLUCOSE SERPL-MCNC: 106 MG/DL (ref 82–115)
HCT VFR BLD AUTO: 36.1 % (ref 42–52)
HGB BLD-MCNC: 11.6 G/DL (ref 14–18)
IMM GRANULOCYTES # BLD AUTO: 0.02 X10(3)/MCL (ref 0–0.04)
IMM GRANULOCYTES NFR BLD AUTO: 0.4 %
INTERVENTRICULAR SEPTUM: 1.02 CM (ref 0.6–1.1)
LEFT ATRIUM SIZE: 3.8 CM
LEFT ATRIUM VOLUME INDEX MOD: 23.2 ML/M2
LEFT ATRIUM VOLUME MOD: 43.9 CM3
LEFT INTERNAL DIMENSION IN SYSTOLE: 2.6 CM (ref 2.1–4)
LEFT VENTRICLE DIASTOLIC VOLUME INDEX: 36.83 ML/M2
LEFT VENTRICLE DIASTOLIC VOLUME: 69.6 ML
LEFT VENTRICLE MASS INDEX: 70 G/M2
LEFT VENTRICLE SYSTOLIC VOLUME INDEX: 13 ML/M2
LEFT VENTRICLE SYSTOLIC VOLUME: 24.6 ML
LEFT VENTRICULAR INTERNAL DIMENSION IN DIASTOLE: 3.99 CM (ref 3.5–6)
LEFT VENTRICULAR MASS: 131.99 G
LV LATERAL E/E' RATIO: 10.63 M/S
LV SEPTAL E/E' RATIO: 10.63 M/S
LVOT MG: 2 MMHG
LVOT MV: 0.57 CM/S
LYMPHOCYTES # BLD AUTO: 1.79 X10(3)/MCL (ref 0.6–4.6)
LYMPHOCYTES NFR BLD AUTO: 35.7 %
MCH RBC QN AUTO: 28.1 PG (ref 27–31)
MCHC RBC AUTO-ENTMCNC: 32.1 G/DL (ref 33–36)
MCV RBC AUTO: 87.4 FL (ref 80–94)
MONOCYTES # BLD AUTO: 0.89 X10(3)/MCL (ref 0.1–1.3)
MONOCYTES NFR BLD AUTO: 17.8 %
MV PEAK A VEL: 0.91 M/S
MV PEAK E VEL: 0.85 M/S
NEUTROPHILS # BLD AUTO: 2.16 X10(3)/MCL (ref 2.1–9.2)
NEUTROPHILS NFR BLD AUTO: 43.1 %
NRBC BLD AUTO-RTO: 0 %
OHS LV EJECTION FRACTION SIMPSONS BIPLANE MOD: 57 %
PLATELET # BLD AUTO: 246 X10(3)/MCL (ref 130–400)
PMV BLD AUTO: 10.4 FL (ref 7.4–10.4)
POTASSIUM SERPL-SCNC: 4.3 MMOL/L (ref 3.5–5.1)
PROT SERPL-MCNC: 6 GM/DL (ref 5.8–7.6)
PV PEAK GRADIENT: 3 MMHG
PV PEAK VELOCITY: 0.92 M/S
RA PRESSURE ESTIMATED: 3 MMHG
RBC # BLD AUTO: 4.13 X10(6)/MCL (ref 4.7–6.1)
SODIUM SERPL-SCNC: 137 MMOL/L (ref 136–145)
TDI LATERAL: 0.08 M/S
TDI SEPTAL: 0.08 M/S
TDI: 0.08 M/S
TRICUSPID ANNULAR PLANE SYSTOLIC EXCURSION: 2.29 CM
TSH SERPL-ACNC: 1.87 UIU/ML (ref 0.35–4.94)
WBC # SPEC AUTO: 5.01 X10(3)/MCL (ref 4.5–11.5)
Z-SCORE OF LEFT VENTRICULAR DIMENSION IN END DIASTOLE: -2.82
Z-SCORE OF LEFT VENTRICULAR DIMENSION IN END SYSTOLE: -1.79

## 2023-11-01 PROCEDURE — 25000003 PHARM REV CODE 250: Performed by: INTERNAL MEDICINE

## 2023-11-01 PROCEDURE — 85025 COMPLETE CBC W/AUTO DIFF WBC: CPT

## 2023-11-01 PROCEDURE — 96360 HYDRATION IV INFUSION INIT: CPT | Mod: 59

## 2023-11-01 PROCEDURE — 96372 THER/PROPH/DIAG INJ SC/IM: CPT

## 2023-11-01 PROCEDURE — 80053 COMPREHEN METABOLIC PANEL: CPT

## 2023-11-01 PROCEDURE — 97110 THERAPEUTIC EXERCISES: CPT | Mod: CQ

## 2023-11-01 PROCEDURE — 97530 THERAPEUTIC ACTIVITIES: CPT | Mod: CO

## 2023-11-01 PROCEDURE — 25000003 PHARM REV CODE 250

## 2023-11-01 PROCEDURE — 84443 ASSAY THYROID STIM HORMONE: CPT | Performed by: NURSE PRACTITIONER

## 2023-11-01 PROCEDURE — G0378 HOSPITAL OBSERVATION PER HR: HCPCS

## 2023-11-01 PROCEDURE — 63600175 PHARM REV CODE 636 W HCPCS

## 2023-11-01 PROCEDURE — 97116 GAIT TRAINING THERAPY: CPT | Mod: CQ

## 2023-11-01 RX ADMIN — METHOCARBAMOL 500 MG: 500 TABLET ORAL at 02:11

## 2023-11-01 RX ADMIN — MAGNESIUM HYDROXIDE 2400 MG: 400 SUSPENSION ORAL at 08:11

## 2023-11-01 RX ADMIN — DOCUSATE SODIUM 100 MG: 100 CAPSULE, LIQUID FILLED ORAL at 08:11

## 2023-11-01 RX ADMIN — METHOCARBAMOL 500 MG: 500 TABLET ORAL at 06:11

## 2023-11-01 RX ADMIN — ENOXAPARIN SODIUM 40 MG: 40 INJECTION SUBCUTANEOUS at 08:11

## 2023-11-01 RX ADMIN — POLYETHYLENE GLYCOL 3350 17 G: 17 POWDER, FOR SOLUTION ORAL at 08:11

## 2023-11-01 RX ADMIN — OXYCODONE HYDROCHLORIDE 10 MG: 10 TABLET ORAL at 08:11

## 2023-11-01 RX ADMIN — GABAPENTIN 300 MG: 300 CAPSULE ORAL at 02:11

## 2023-11-01 RX ADMIN — GABAPENTIN 300 MG: 300 CAPSULE ORAL at 08:11

## 2023-11-01 RX ADMIN — SODIUM CHLORIDE 500 ML: 9 INJECTION, SOLUTION INTRAVENOUS at 10:11

## 2023-11-01 NOTE — PT/OT/SLP PROGRESS
Occupational Therapy   Treatment    Name: Artemio Ramsey  MRN: [id number]  Admitting Diagnosis:  Tibia/fibula fracture, left, closed, initial encounter  7 Days Post-Op    Recommendations:     Recommended therapy intensity at discharge: High Intensity Therapy   Discharge Equipment Recommendations:  bath bench, walker, rolling  Barriers to discharge:       Assessment:     Artemio Ramsey is a 67 y.o. male with a medical diagnosis of Tibia/fibula fracture, left, closed, initial encounter. Performance deficits affecting function are weakness, gait instability, impaired balance, impaired endurance, impaired cardiopulmonary response to activity, impaired self care skills, impaired functional mobility, decreased safety awareness. Session limited 2/2 hypotension.     Rehab Prognosis:  Good; patient would benefit from acute skilled OT services to address these deficits and reach maximum level of function.       Plan:     Patient to be seen 3 x/week to address the above listed problems via self-care/home management, therapeutic activities, therapeutic exercises  Plan of Care Expires: 11/23/23  Plan of Care Reviewed with: patient    Subjective     Pain/Comfort:  Pain Rating 1: 0/10    Objective:     Communicated with: RN prior to session.  Patient found up in chair with peripheral IV upon OT entry to room.    General Precautions: Standard, fall    Orthopedic Precautions:LLE weight bearing as tolerated  Braces: N/A  Respiratory Status: Room air  Vital Signs: Blood Pressure: sitting 92/59, standing 56/37. RN present to give bolus     Occupational Performance:     Functional Mobility/Transfers:  Patient completed Sit <> Stand Transfer with minimum assistance  with  rolling walker  from bedside chair x1 trial to check BP (56/37). Pt c/o slight dizziness. RN present to give bolus. Not appropriate for further mobility.     Therapeutic Positioning    OT interventions performed during the course of today's session in an effort to prevent  and/or reduce acquired pressure injuries:   Education was provided on benefits of and recommendations for therapeutic positioning    Skin assessment: all bony prominences were assessed    Findings: no redness or breakdown noted    AMPA 6 Click ADL: 19    Patient Education:  Patient provided with verbal education education regarding OT role/goals/POC and safety awareness.  Understanding was verbalized, however additional teaching warranted.      Patient left up in chair with all lines intact, call button in reach, and RN present    GOALS:   Multidisciplinary Problems       Occupational Therapy Goals          Problem: Occupational Therapy    Goal Priority Disciplines Outcome Interventions   Occupational Therapy Goal     OT, PT/OT Ongoing, Progressing    Description: STG: to be met by 11/26/23    Pt will complete grooming standing at sink with LRAD with SBA.  Pt will complete UB dressing independently while seated.  Pt will complete LB dressing with SBA using LRAD and AE prn.   Pt will complete toileting with SBA using LRAD.  Pt will complete functional mobility to/from toilet and toilet transfer with SBA using LRAD.                        Time Tracking:     OT Date of Treatment: 11/01/23  OT Start Time: 1029  OT Stop Time: 1037  OT Total Time (min): 8 min    Billable Minutes:Therapeutic Activity 8    OT/JOCELYNN: JOCELYNN     Number of JOCELYNN visits since last OT visit: 4    11/1/2023

## 2023-11-01 NOTE — CONSULTS
"[facility] Hospital Medicine - H&P Note    Patient Name: Artemio Ramsey  : 1955  MRN: [id number]  PCP: Cherelle, Primary Doctor  Consulting Physician: HIGINIO Molina MD  Admission Class: OP- Observation   Code status: Full    Allergies   Patient has no known allergies.    Chief Complaint   Leg pain     History of Present Illness   67 yr old male who presented to ED on 10/24 via air med after tripping on a wooden deck and sustaining a left tib-fib fracture. Underwent surgery repair with intramedullary implant on 10/25. Has been doing well. Ambulating with therapy and pain well controlled. Consulted for episodes of bradycardia and medical management. Has history of hypertension but not on any meds prior to admission.     ROS   Except as documented, all other systems reviewed and negative     Past Medical History   Hypertension    Past Surgical History   ORIF left tib/fib fracture    Social History   Former smoker. Occasional ETOH use. Denies illicit drug use.     Family History   Reviewed and negative    Home Medications     Prior to Admission medications    Not on File        Physical Exam   Vital Signs  Temp:  [97.6 °F (36.4 °C)-98.8 °F (37.1 °C)]   Pulse:  [59-62]   Resp:  [16]   BP: (109-111)/(67-70)   SpO2:  [96 %-97 %]    General: Appears comfortable  HEENT: NC/AT  Neck:  No JVD  Chest: CTABL  CVS: Regular rhythm. Normal S1/S2.  Abdomen: nondistended, normoactive BS, soft and non-tender.  MSK: No obvious deformity or joint swelling. Staples LLE intact. Incision well approximated. No drainage.   Skin: Warm and dry  Neuro: AAOx3, no focal neurological deficit  Psych: Cooperative    Labs     Recent Labs     10/30/23  0432 10/31/23  0432   WBC 5.04 5.00   RBC 4.46* 4.06*   HGB 12.5* 11.3*   HCT 37.8* 34.2*   MCV 84.8 84.2   MCH 28.0 27.8   MCHC 33.1 33.0   RDW 15.0 14.9    260   CRP 60.90*  --      No results for input(s): "PROTIME", "INR", "PTT", "D-DIMER", "FERRITIN", "IRON", " ""TRANS", "TIBC", "LABIRON", "[id number]", "FOLATE", "LDH", "HAPTOGLOBIN", "RETICCNTAUTO", "RETABS", "PERIPSMEAREV" in the last 72 hours.   Recent Labs     10/30/23  0432 10/31/23  0432    138   K 3.8 3.9   CHLORIDE 101 102   CO2 29 29   BUN 10.7 13.3   CREATININE 0.70* 0.69*   EGFRNORACEVR >60 >60   GLUCOSE 94 96   CALCIUM 9.2 8.8   ALBUMIN 3.0* 2.9*   GLOBULIN 3.6* 2.9   ALKPHOS 57 57   ALT 20 19   AST 24 21   BILITOT 1.2 1.0     No results for input(s): "LACTIC" in the last 72 hours.  No results for input(s): "CPK", "TROPONINI" in the last 72 hours.     Microbiology Results (last 7 days)       ** No results found for the last 168 hours. **           Imaging   X-Ray Tibia Fibula 2 View Left  Narrative: EXAMINATION:  XR TIBIA FIBULA 2 VIEW LEFT    CLINICAL HISTORY:  post op;    COMPARISON:  Yesterday    FINDINGS:  Two views of the left tibia and fibula demonstrate expected changes of interval placement intramedullary isadora in the tibia with anatomic position and alignment.  There is also improved position and alignment of the fibular fracture.  Impression: As above.    Electronically signed by: Vega Jain  Date:    10/25/2023  Time:    13:16  SURG FL Surgery Fluoro Usage  See OP Notes for results.     IMPRESSION: See OP Notes for results.     This procedure was auto-finalized by: Virtual Radiologist    Assessment & Plan     Hypertension - stable, not on home meds    Intermittent bradycardia - asymptomatic  - rate 49-64  - EKG pending  - monitor telemetry    S/P ORIF left tib/fib fracture - 10/25  - WBAT LLE  - Lovenox while inpatient and ASA 81mg BID at discharge  - Daily dressing changes and open to air if no drainage  - Start showers POD 7  - No ointments or creams  - case management on board for discharge planning and placement    VTE Prophylaxis: Lovenox     I, Bessy Arguelles, FNP-BC have discussed this patients case with Dr. Molina who agrees with the diagnosis and treatment " plan.      ________________________________________________________________________  I, Dr. Bret Molina assumed care of this patient.  For the patient encounter, I performed the substantive portion of the visit, I reviewed the NPPA documentation, treatment plan, and medical decision making.  I had face to face time with this patient.  I have personally reviewed the labs and test results that are presently available. I have reviewed or attempted to review medical records based upon their availability. If patient was admitted under observational status it is with my approval.      Pleasant 67-year-old male presents after a fall resulting in left tib-fib fracture which was surgically repaired on 10/25/2023 and he is awaiting rehab placement.  Hospitalist service was consulted for bradycardia.  EKG showed sinus rhythm with a rate of 65, nonspecific T-wave changes.  Patient asymptomatic in terms of bradycardia.  Will continue to monitor.    Time seen: 11PM 10/31  Bret Molina MD

## 2023-11-01 NOTE — PLAN OF CARE
Bryce with [facility] asked if plan was for pt to return home after rehab. I knows that is what pt plans on but [facility] and ask [phone]  Will follow for outcome of [facility] referral.

## 2023-11-01 NOTE — PROGRESS NOTES
"[facility]  Orthopedics  Progress Note    Patient Name: Artemio aRmsey  MRN: [id number]  Admission Date: 10/24/2023  Hospital Length of Stay: 1 days  Attending Provider: Jose Ramos DO  Primary Care Provider: Cherelle, Primary Doctor  Follow-up For: Procedure(s) (LRB):  INSERTION, INTRAMEDULLARY LOPEZ, TIBIA (Left)    Post-Operative Day: 7 Days Post-Op  Subjective:     Principal Problem:Tibia/fibula fracture, left, closed, initial encounter    Principal Orthopedic Problem: 7 Days Post-Op   Left tibial nail    Interval History:  Patient doing well states his pain is well controlled.  He is awake in bed, alert.  He is mobilizing with therapy. No complaints. Awaiting placement    Review of patient's allergies indicates:  No Known Allergies    Current Facility-Administered Medications   Medication    docusate sodium capsule 100 mg    enoxaparin injection 40 mg    gabapentin capsule 300 mg    HYDROmorphone (PF) injection 1 mg    influenza 65up-adj (QUADRIVALENT ADJUVANTED PF) vaccine 0.5 mL    magnesium hydroxide 400 mg/5 ml suspension 2,400 mg    melatonin tablet 6 mg    methocarbamoL tablet 500 mg    oxyCODONE immediate release tablet 5 mg    oxyCODONE immediate release tablet Tab 10 mg    pneumoc 20-evelio conj-dip cr(PF) (PREVNAR-20 (PF)) injection Syrg 0.5 mL    polyethylene glycol packet 17 g    sodium chloride 0.9% flush 10 mL     Objective:     Vital Signs (Most Recent):  Temp: 97.9 °F (36.6 °C) (11/01/23 0000)  Pulse: 61 (11/01/23 0000)  Resp: 16 (11/01/23 0000)  BP: 108/67 (11/01/23 0000)  SpO2: 97 % (11/01/23 0000) Vital Signs (24h Range):  Temp:  [97.6 °F (36.4 °C)-98.8 °F (37.1 °C)] 97.9 °F (36.6 °C)  Pulse:  [50-62] 61  Resp:  [16] 16  SpO2:  [96 %-98 %] 97 %  BP: (108-120)/(67-73) 108/67     Weight: 69.9 kg (154 lb)  Height: 5' 11" (180.3 cm)  Body mass index is 21.48 kg/m².      Intake/Output Summary (Last 24 hours) at 11/1/2023 0708  Last data filed at 11/1/2023 0431  Gross per 24 hour "   Intake 220 ml   Output 1010 ml   Net -790 ml         Physical Exam:     General the patient is alert  no acute distress nontoxic-appearing appropriate affect.    Constitutional: Vital signs are examined and stable.  Resp: No signs of labored breathing    LLE: -Skin: Dressing CDI, No signs of new abrasions or lacerations, no scars           -MSK: EHL/FHL, Gastroc/Tib anterior Strength 5/5           -Neuro:  Sensation intact to light touch L3-S1 dermatomes           -Lymphatic: No signs of lymphadenopathy           -CV: Capillary refill is less than 2 seconds. DP/PT pulses 2/4. Compartments soft and compressible                          Diagnostic Findings:   Significant Labs:   Recent Lab Results         11/01/23  0355   10/31/23  0432   10/30/23  0432        Albumin/Globulin Ratio 0.9   1.0   0.8       Albumin 2.9   2.9   3.0       ALP 59   57   57       ALT 25   19   20       AST 27   21   24       Baso # 0.03   0.02   0.03       Basophil % 0.6   0.4   0.6       BILIRUBIN TOTAL 0.9   1.0   1.2       BUN 16.6   13.3   10.7       Calcium 8.8   8.8   9.2       Chloride 103   102   101       CO2 27   29   29       Creatinine 0.75   0.69   0.70       CRP     60.90       eGFR >60   >60   >60       Eos # 0.12   0.10   0.13       Eosinophil % 2.4   2.0   2.6       Globulin, Total 3.1   2.9   3.6       Glucose 106   96   94       Hematocrit 36.1   34.2   37.8       Hemoglobin 11.6   11.3   12.5       Immature Grans (Abs) 0.02   0.01   0.02       Immature Granulocytes 0.4   0.2   0.4       Lymph # 1.79   1.75   1.54       LYMPH % 35.7   35.0   30.6       MCH 28.1   27.8   28.0       MCHC 32.1   33.0   33.1       MCV 87.4   84.2   84.8       Mono # 0.89   0.88   0.83       Mono % 17.8   17.6   16.5       MPV 10.4   11.0   10.3       Neut # 2.16   2.24   2.49       Neut % 43.1   44.8   49.3       nRBC 0.0   0.0   0.0       Platelet Count 246   260   221       Potassium 4.3   3.9   3.8       Prealbumin     12.5       PROTEIN  TOTAL 6.0   5.8   6.6       RBC 4.13   4.06   4.46       RDW 15.1   14.9   15.0       Sodium 137   138   137       WBC 5.01   5.00   5.04                Significant Imaging: I have reviewed all pertinent imaging results/findings.    Assessment/Plan:     Active Diagnoses:    Diagnosis Date Noted POA    PRINCIPAL PROBLEM:  Tibia/fibula fracture, left, closed, initial encounter [S82.202A, S82.402A] 10/24/2023 Yes    Trauma [T14.90XA] 10/25/2023 Unknown      Problems Resolved During this Admission:     H&H stable  Patient is status post left tibial shaft open reduction internal fixation with a tibial nail.  He has been doing well.  Weightbearing as tolerated left lower extremity.  Participating with therapy.  Mild confusion at baseline- unchanged  Continue lovenox for DVT ppx during stay followed by aspirin 81 mg BID at discharge.   Continue multimodal pain control  Daily dry dressing changes; may leave open to air if no drainage. May begin showers POD 7. No ointments or creams  Follow up with Ashlie Gilbert in approx 3 weeks for wound check and repeat x rays.   Stable for discharge when ready, referral sent to [facility] per CM note, appreciate their help with this patient. Please reach out and orders will be placed at that time.   Consulted medicine for evaluation of his bradycardia, they are following as well.    The above findings, diagnostics, and treatment plan were discussed with Dr. Ramos who is in agreement with the plan of care except as stated in additional documentation.     Alexandra Blair Lemaire, PA-C [facility]   Orthopedic Trauma

## 2023-11-01 NOTE — PROGRESS NOTES
[facility] Hospital Medicine Progress Note        Chief Complaint: Inpatient Follow-up     HPI:   67 yr old male who presented to ED on 10/24 via air med after tripping on a wooden deck and sustaining a left tib-fib fracture. Underwent surgery repair with intramedullary implant on 10/25. Has been doing well. Ambulating with therapy and pain well controlled. Consulted for episodes of bradycardia and medical management. Has history of hypertension but not on any meds prior to admission.       Interval Hx:   RN reported patient had positive orthostatic vitals inpatient being symptomatic while working with the physical therapy.    Seen and examined sitting in chair   He reported he felt dizzy while he was working with the physical therapy and reported no symptoms at rest denied any chest, dizziness, vision issues at rest  Vitals noted    Objective/physical exam:  General: In no acute distress, afebrile  Chest: Clear to auscultation bilaterally  Heart: RRR  Abdomen: Soft, nontender, BS +  MSK: Warm, left lower extremity staples noted intact surgical wound healing well  Neurologic: Alert and oriented x4, no focal neurological deficit  VITAL SIGNS: 24 HRS MIN & MAX LAST   Temp  Min: 97.6 °F (36.4 °C)  Max: 98.8 °F (37.1 °C) 98 °F (36.7 °C)   BP  Min: 106/63  Max: 111/67 106/63   Pulse  Min: 55  Max: 62  (!) 55   Resp  Min: 16  Max: 16 16   SpO2  Min: 96 %  Max: 97 % 96 %     I have reviewed the following labs:  Recent Labs   Lab 10/30/23  0432 10/31/23  0432 11/01/23  0355   WBC 5.04 5.00 5.01   RBC 4.46* 4.06* 4.13*   HGB 12.5* 11.3* 11.6*   HCT 37.8* 34.2* 36.1*   MCV 84.8 84.2 87.4   MCH 28.0 27.8 28.1   MCHC 33.1 33.0 32.1*   RDW 15.0 14.9 15.1    260 246   MPV 10.3 11.0* 10.4     Recent Labs   Lab 10/30/23  0432 10/31/23  0432 11/01/23  0355    138 137   K 3.8 3.9 4.3   CO2 29 29 27   BUN 10.7 13.3 16.6   CREATININE 0.70* 0.69* 0.75   CALCIUM 9.2 8.8 8.8   ALBUMIN 3.0* 2.9*  2.9*   ALKPHOS 57 57 59   ALT 20 19 25   AST 24 21 27   BILITOT 1.2 1.0 0.9     Microbiology Results (last 7 days)       ** No results found for the last 168 hours. **             See below for Radiology    Scheduled Med:   docusate sodium  100 mg Oral BID    enoxparin  40 mg Subcutaneous Q12H    gabapentin  300 mg Oral TID    methocarbamoL  500 mg Oral Q8H    polyethylene glycol  17 g Oral BID      Continuous Infusions:     PRN Meds:  HYDROmorphone, influenza 65up-adj, magnesium hydroxide 400 mg/5 ml, melatonin, oxyCODONE, oxyCODONE, pneumoc 20-evelio conj-dip cr(PF), sodium chloride 0.9%     Assessment/Plan:  Orthostatic hypotension   Intermittent bradycardia - asymptomatic  S/P ORIF left tib/fib fracture - 10/25  Hypertension - stable, not on home meds        Labs from this morning noted hemoglobin 11.6 stable, lytes within normal limits albumin 2.9, TSH 1.874   Continue to monitor the patient on tele   Give bolus 500 cc x1 and recheck orthostatics if still positive bolus another 500 cc and recheck  Discussed with patient's nurse to notify post bolus   Obtain echocardiogram has been   Hold antihypertensives   Avoid beta-blockers   Postop care per primary/ortho team    Thank you for consult.  We will continue follow along      VTE prophylaxis:  Lovenox while inpatient and ASA 81mg BID at discharge               _____________________________________________________________________    Nutrition Status:    Radiology:  I have personally reviewed the following imaging and agree with the radiologist.     X-Ray Tibia Fibula 2 View Left  Narrative: EXAMINATION:  XR TIBIA FIBULA 2 VIEW LEFT    CLINICAL HISTORY:  post op;    COMPARISON:  Yesterday    FINDINGS:  Two views of the left tibia and fibula demonstrate expected changes of interval placement intramedullary isadora in the tibia with anatomic position and alignment.  There is also improved position and alignment of the fibular fracture.  Impression: As  above.    Electronically signed by: Vega Jain  Date:    10/25/2023  Time:    13:16  SURG FL Surgery Fluoro Usage  See OP Notes for results.     IMPRESSION: See OP Notes for results.     This procedure was auto-finalized by: Virtual Radiologist      Wilman Leon MD   11/01/2023

## 2023-11-01 NOTE — PT/OT/SLP PROGRESS
Physical Therapy Treatment    Patient Name:  Artemio Ramsey   MRN:  [id number]    Recommendations:     Discharge Recommendations:      Discharge Equipment Recommendations: walker, rolling, bath bench     Subjective     Patient awake and cooperative.     Objective:     General Precautions: Standard, fall   Orthopedic Precautions:LLE weight bearing as tolerated   Braces:    Respiratory Status: Room air  Communicated with nurse prior to treatment.     Functional Mobility:    Rolling:Independent  Supine to sit:Supervision or Set-up Assistance  Sit to stand transfer: Stand-by Assistance  Bed to chair transfer:Stand-by Assistance    Gait 25 ft with RW CGA and c/o hypotension. BP right arm sitting 75/42 HR 65, left arm sitting  89/52 HR 66, Standing 51/374 HR 60, Sitting 96/56. Pt was able to perform LE PRE's to increase strenth, ROM, and endurance to improve overall independence. Nurse assessed pt @ bedside.     Education Provided:  Role and goals of PT, transfer training, bed mobility, gait training, balance training, safety awareness, assistive device, strengthening exercises, and importance of participating in PT to return to PLOF.           Skin Integrity:  staples intact with no drainage    PT interventions performed during the course of today's session in an effort to prevent and/or reduce acquired pressure injuries:   Therapeutic positioning completed       GOALS:   Multidisciplinary Problems       Physical Therapy Goals          Problem: Physical Therapy    Goal Priority Disciplines Outcome Goal Variances Interventions   Physical Therapy Goal     PT, PT/OT Ongoing, Progressing     Description: Goals to be met by: 23     Patient will increase functional independence with mobility by performin. Supine to sit with [place]  2. Sit to supine with [place]  3. Sit to stand transfer with Modified [place]  4. Gait  x 200 feet with Modified [place] using Rolling Walker.   5. Ascend/descend 4  stairs with no Handrails & Stand-by Assistance                         Assessment:     Artemio Ramsey is a 67 y.o. male admitted with a medical diagnosis of Tibia/fibula fracture, left, closed, initial encounter.     Patient Active Problem List   Diagnosis    Tibia/fibula fracture, left, closed, initial encounter    Trauma        Rehab Prognosis: Good; patient would benefit from acute skilled PT services to address these deficits and reach maximum level of function.    Recent Surgery: Procedure(s) (LRB):  INSERTION, INTRAMEDULLARY LOPEZ, TIBIA (Left) 7 Days Post-Op    Plan:     During this hospitalization, patient to be seen 6 x/week to address the identified rehab impairments via gait training, therapeutic activities and progress toward the following goals:    Plan of Care Expires:  11/26/23    Billable Minutes     Billable Minutes: Gait Training 10 and Therapeutic Exercise 13    Treatment Type: Treatment  PT/PTA: PTA     Number of PTA visits since last PT visit: 5     11/01/2023

## 2023-11-02 LAB
ALBUMIN SERPL-MCNC: 3.1 G/DL (ref 3.4–4.8)
ALBUMIN/GLOB SERPL: 1 RATIO (ref 1.1–2)
ALP SERPL-CCNC: 60 UNIT/L (ref 40–150)
ALT SERPL-CCNC: 27 UNIT/L (ref 0–55)
AST SERPL-CCNC: 25 UNIT/L (ref 5–34)
BASOPHILS # BLD AUTO: 0.03 X10(3)/MCL
BASOPHILS NFR BLD AUTO: 0.5 %
BILIRUB SERPL-MCNC: 0.8 MG/DL
BUN SERPL-MCNC: 16.3 MG/DL (ref 8.4–25.7)
CALCIUM SERPL-MCNC: 9.3 MG/DL (ref 8.8–10)
CHLORIDE SERPL-SCNC: 100 MMOL/L (ref 98–107)
CO2 SERPL-SCNC: 30 MMOL/L (ref 23–31)
CREAT SERPL-MCNC: 0.82 MG/DL (ref 0.73–1.18)
CRP SERPL-MCNC: 18.9 MG/L
EOSINOPHIL # BLD AUTO: 0.13 X10(3)/MCL (ref 0–0.9)
EOSINOPHIL NFR BLD AUTO: 2.2 %
ERYTHROCYTE [DISTWIDTH] IN BLOOD BY AUTOMATED COUNT: 15.2 % (ref 11.5–17)
GFR SERPLBLD CREATININE-BSD FMLA CKD-EPI: >60 MLS/MIN/1.73/M2
GLOBULIN SER-MCNC: 3.1 GM/DL (ref 2.4–3.5)
GLUCOSE SERPL-MCNC: 97 MG/DL (ref 82–115)
HCT VFR BLD AUTO: 35.5 % (ref 42–52)
HGB BLD-MCNC: 11.6 G/DL (ref 14–18)
IMM GRANULOCYTES # BLD AUTO: 0.03 X10(3)/MCL (ref 0–0.04)
IMM GRANULOCYTES NFR BLD AUTO: 0.5 %
LYMPHOCYTES # BLD AUTO: 2.18 X10(3)/MCL (ref 0.6–4.6)
LYMPHOCYTES NFR BLD AUTO: 37.2 %
MCH RBC QN AUTO: 28.4 PG (ref 27–31)
MCHC RBC AUTO-ENTMCNC: 32.7 G/DL (ref 33–36)
MCV RBC AUTO: 86.8 FL (ref 80–94)
MONOCYTES # BLD AUTO: 0.96 X10(3)/MCL (ref 0.1–1.3)
MONOCYTES NFR BLD AUTO: 16.4 %
NEUTROPHILS # BLD AUTO: 2.53 X10(3)/MCL (ref 2.1–9.2)
NEUTROPHILS NFR BLD AUTO: 43.2 %
NRBC BLD AUTO-RTO: 0 %
PLATELET # BLD AUTO: 297 X10(3)/MCL (ref 130–400)
PMV BLD AUTO: 10.4 FL (ref 7.4–10.4)
POTASSIUM SERPL-SCNC: 4.5 MMOL/L (ref 3.5–5.1)
PREALB SERPL-MCNC: 15.3 MG/DL (ref 16–42)
PROT SERPL-MCNC: 6.2 GM/DL (ref 5.8–7.6)
RBC # BLD AUTO: 4.09 X10(6)/MCL (ref 4.7–6.1)
SODIUM SERPL-SCNC: 138 MMOL/L (ref 136–145)
WBC # SPEC AUTO: 5.86 X10(3)/MCL (ref 4.5–11.5)

## 2023-11-02 PROCEDURE — G0378 HOSPITAL OBSERVATION PER HR: HCPCS

## 2023-11-02 PROCEDURE — 96372 THER/PROPH/DIAG INJ SC/IM: CPT

## 2023-11-02 PROCEDURE — 63600175 PHARM REV CODE 636 W HCPCS

## 2023-11-02 PROCEDURE — 97164 PT RE-EVAL EST PLAN CARE: CPT

## 2023-11-02 PROCEDURE — 25000003 PHARM REV CODE 250

## 2023-11-02 RX ADMIN — METHOCARBAMOL 500 MG: 500 TABLET ORAL at 09:11

## 2023-11-02 RX ADMIN — DOCUSATE SODIUM 100 MG: 100 CAPSULE, LIQUID FILLED ORAL at 10:11

## 2023-11-02 RX ADMIN — ENOXAPARIN SODIUM 40 MG: 40 INJECTION SUBCUTANEOUS at 09:11

## 2023-11-02 RX ADMIN — POLYETHYLENE GLYCOL 3350 17 G: 17 POWDER, FOR SOLUTION ORAL at 10:11

## 2023-11-02 RX ADMIN — DOCUSATE SODIUM 100 MG: 100 CAPSULE, LIQUID FILLED ORAL at 09:11

## 2023-11-02 RX ADMIN — ENOXAPARIN SODIUM 40 MG: 40 INJECTION SUBCUTANEOUS at 10:11

## 2023-11-02 RX ADMIN — GABAPENTIN 300 MG: 300 CAPSULE ORAL at 10:11

## 2023-11-02 RX ADMIN — OXYCODONE HYDROCHLORIDE 5 MG: 5 TABLET ORAL at 09:11

## 2023-11-02 NOTE — PROGRESS NOTES
[facility] Hospital Medicine Progress Note        Chief Complaint: Inpatient Follow-up     HPI:   67 yr old male who presented to ED on 10/24 via air med after tripping on a wooden deck and sustaining a left tib-fib fracture. Underwent surgery repair with intramedullary implant on 10/25. Has been doing well. Ambulating with therapy and pain well controlled. Consulted for episodes of bradycardia and medical management. Has history of hypertension but not on any meds prior to admission.     Orthostatic vitals were checked, positive improved with iv hydration.    Interval Hx:   No acute events reported  Pt received iv 500cc bolus improved orthostatic vitals  Pt reported no dizziness, reported improvements in symptoms , no complaints today      Objective/physical exam:  General: In no acute distress, afebrile  Chest: Clear to auscultation bilaterally  Heart: RRR  Abdomen: Soft, nontender, BS +  MSK: Warm, left lower extremity staples noted intact surgical wound healing well  Neurologic: Alert and oriented x4, no focal neurological deficit  VITAL SIGNS: 24 HRS MIN & MAX LAST   Temp  Min: 97.6 °F (36.4 °C)  Max: 98.3 °F (36.8 °C) 97.6 °F (36.4 °C)   BP  Min: 77/56  Max: 145/80 101/61   Pulse  Min: 53  Max: 61  60   Resp  Min: 17  Max: 18 18   SpO2  Min: 96 %  Max: 98 % 98 %     I have reviewed the following labs:  Recent Labs   Lab 10/31/23  0432 11/01/23  0355 11/02/23  0420   WBC 5.00 5.01 5.86   RBC 4.06* 4.13* 4.09*   HGB 11.3* 11.6* 11.6*   HCT 34.2* 36.1* 35.5*   MCV 84.2 87.4 86.8   MCH 27.8 28.1 28.4   MCHC 33.0 32.1* 32.7*   RDW 14.9 15.1 15.2    246 297   MPV 11.0* 10.4 10.4     Recent Labs   Lab 10/31/23  0432 11/01/23  0355 11/02/23  0420    137 138   K 3.9 4.3 4.5   CO2 29 27 30   BUN 13.3 16.6 16.3   CREATININE 0.69* 0.75 0.82   CALCIUM 8.8 8.8 9.3   ALBUMIN 2.9* 2.9* 3.1*   ALKPHOS 57 59 60   ALT 19 25 27   AST 21 27 25   BILITOT 1.0 0.9 0.8     Microbiology  Results (last 7 days)       ** No results found for the last 168 hours. **             See below for Radiology    Scheduled Med:   docusate sodium  100 mg Oral BID    enoxparin  40 mg Subcutaneous Q12H    gabapentin  300 mg Oral TID    methocarbamoL  500 mg Oral Q8H    polyethylene glycol  17 g Oral BID      Continuous Infusions:     PRN Meds:  HYDROmorphone, influenza 65up-adj, magnesium hydroxide 400 mg/5 ml, melatonin, oxyCODONE, oxyCODONE, pneumoc 20-evelio conj-dip cr(PF), sodium chloride 0.9%     Assessment/Plan:  Orthostatic hypotension suspected poor po intake  Intermittent bradycardia - asymptomatic-Sinus rama with 1st degree AV block  S/P ORIF left tib/fib fracture - 10/25  Hypertension - stable, not on home meds        Labs from this morning noted hb 11.6, pre albumin 15.3, crp 18.9, lytes wnl  Check orthostatics again today, seems iv hydration improved his bp, and symptoms  Echo noted, LVEF 60-65 with normal diastolic function  Avoid beta blockers  Monitor on tele  Cont Postop care per primary/ortho team    Thank you for consult.  We will continue follow along      VTE prophylaxis:  Lovenox while inpatient and ASA 81mg BID at discharge      Dispo: ROCKY working on placement         _____________________________________________________________________    Nutrition Status:    Radiology:  I have personally reviewed the following imaging and agree with the radiologist.     Echo    Left Ventricle: The left ventricle is normal in size. Normal wall   thickness. Normal wall motion. There is normal systolic function with a   visually estimated ejection fraction of 60 - 65%. There is normal   diastolic function.    Right Ventricle: Normal right ventricular cavity size. Wall thickness   is normal. Right ventricle wall motion  is normal. Systolic function is   normal.    Mitral Valve: There is mild regurgitation.    Tricuspid Valve: There is mild regurgitation.    Pulmonic Valve: There is mild regurgitation.    IVC/SVC:  Normal venous pressure at 3 mmHg.      Wilman Leon MD   11/02/2023

## 2023-11-02 NOTE — PLAN OF CARE
Bryce talked with Mathieu and will submit to insurer per her notes yesterday.   Will follow for [facility] medicare response.

## 2023-11-02 NOTE — PROGRESS NOTES
"[facility] Neuro  Orthopedics  Progress Note    Patient Name: Artemio Ramsey  MRN: [id number]  Admission Date: 10/24/2023  Hospital Length of Stay: 1 days  Attending Provider: Jose Ramos DO  Primary Care Provider: Cherelle, Primary Doctor  Follow-up For: Procedure(s) (LRB):  INSERTION, INTRAMEDULLARY LOPEZ, TIBIA (Left)    Post-Operative Day: 8 Days Post-Op  Subjective:     Principal Problem:Tibia/fibula fracture, left, closed, initial encounter    Principal Orthopedic Problem: 8 Days Post-Op   Left tibial nail    Interval History:  Patient doing well states his pain is well controlled.  He is awake in bed, alert.  He is mobilizing with therapy. No complaints. Awaiting placement to [facility] rehab. Elder affairs involved making placement more complex.    Review of patient's allergies indicates:  No Known Allergies    Current Facility-Administered Medications   Medication    docusate sodium capsule 100 mg    enoxaparin injection 40 mg    gabapentin capsule 300 mg    HYDROmorphone (PF) injection 1 mg    influenza 65up-adj (QUADRIVALENT ADJUVANTED PF) vaccine 0.5 mL    magnesium hydroxide 400 mg/5 ml suspension 2,400 mg    melatonin tablet 6 mg    methocarbamoL tablet 500 mg    oxyCODONE immediate release tablet 5 mg    oxyCODONE immediate release tablet Tab 10 mg    pneumoc 20-evelio conj-dip cr(PF) (PREVNAR-20 (PF)) injection Syrg 0.5 mL    polyethylene glycol packet 17 g    sodium chloride 0.9% flush 10 mL     Objective:     Vital Signs (Most Recent):  Temp: 98 °F (36.7 °C) (11/02/23 0444)  Pulse: (!) 53 (11/02/23 0444)  Resp: 18 (11/02/23 0444)  BP: 115/73 (11/02/23 0444)  SpO2: 97 % (11/02/23 0444) Vital Signs (24h Range):  Temp:  [98 °F (36.7 °C)-98.3 °F (36.8 °C)] 98 °F (36.7 °C)  Pulse:  [53-61] 53  Resp:  [17-18] 18  SpO2:  [96 %-98 %] 97 %  BP: ()/(56-80) 115/73     Weight: 69.9 kg (154 lb)  Height: 5' 11" (180.3 cm)  Body mass index is 21.48 kg/m².      Intake/Output Summary (Last 24 hours) at " 11/2/2023 0805  Last data filed at 11/2/2023 0446  Gross per 24 hour   Intake --   Output 300 ml   Net -300 ml         Physical Exam:     General the patient is alert  no acute distress nontoxic-appearing appropriate affect.    Constitutional: Vital signs are examined and stable.  Resp: No signs of labored breathing    LLE: -Skin: Dressing CDI, No signs of new abrasions or lacerations, no scars           -MSK: EHL/FHL, Gastroc/Tib anterior Strength 5/5           -Neuro:  Sensation intact to light touch L3-S1 dermatomes           -Lymphatic: No signs of lymphadenopathy           -CV: Capillary refill is less than 2 seconds. DP/PT pulses 2/4. Compartments soft and compressible                          Diagnostic Findings:   Significant Labs:   Recent Lab Results         11/02/23  0420   11/01/23  1326   11/01/23  0355   10/31/23  0432        Albumin/Globulin Ratio 1.0     0.9   1.0       Albumin 3.1     2.9   2.9       ALP 60     59   57       ALT 27     25   19       Ao peak ashley   1.21           Ao VTI   23.60           AST 25     27   21       AV mean gradient   3           AV index (prosthetic)   0.86           AV peak gradient   6           AV Velocity Ratio   0.77           Baso # 0.03     0.03   0.02       Basophil % 0.5     0.6   0.4       BILIRUBIN TOTAL 0.8     0.9   1.0       BSA   1.87           BUN 16.3     16.6   13.3       Calcium 9.3     8.8   8.8       Chloride 100     103   102       CO2 30     27   29       Creatinine 0.82     0.75   0.69       CRP 18.90             Left Ventricle Relative Wall Thickness   0.52           E/A ratio   0.93           E/E' ratio   10.63           eGFR >60     >60   >60       Eos # 0.13     0.12   0.10       Eosinophil % 2.2     2.4   2.0       E wave deceleration time   169.00           FS   35           Globulin, Total 3.1     3.1   2.9       Glucose 97     106   96       Hematocrit 35.5     36.1   34.2       Hemoglobin 11.6     11.6   11.3       Immature Grans (Abs)  0.03     0.02   0.01       Immature Granulocytes 0.5     0.4   0.2       IVSd   1.02           LA size   3.80           LA volume   43.90           LA Volume Index (Mod)   23.2           LV LATERAL E/E' RATIO   10.63           LV SEPTAL E/E' RATIO   10.63           LV EDV BP   69.60           LV Diastolic Volume Index   36.83           LVIDd   3.99           LVIDs   2.60           LV mass   131.99           LV Mass Index   70           Left Ventricular Outflow Tract Mean Gradient   2.00           Left Ventricular Outflow Tract Mean Velocity   0.57           LVOT peak brayan   0.93           LVOT peak VTI   20.20           LV ESV BP   24.60           LV Systolic Volume Index   13.0           Lymph # 2.18     1.79   1.75       LYMPH % 37.2     35.7   35.0       MCH 28.4     28.1   27.8       MCHC 32.7     32.1   33.0       MCV 86.8     87.4   84.2       Mean e'   0.08           Mono # 0.96     0.89   0.88       Mono % 16.4     17.8   17.6       MPV 10.4     10.4   11.0       MV Peak A Baryan   0.91           MV Peak E Brayan   0.85           Neut # 2.53     2.16   2.24       Neut % 43.2     43.1   44.8       nRBC 0.0     0.0   0.0       Azul's Biplane MOD Ejection Fraction   57           Platelet Count 297     246   260       Potassium 4.5     4.3   3.9       Prealbumin 15.3             PROTEIN TOTAL 6.2     6.0   5.8       PV peak gradient   3           PV PEAK VELOCITY   0.92           Posterior Wall   1.04           Est. RA pres   3           RBC 4.09     4.13   4.06       RDW 15.2     15.1   14.9       Sodium 138     137   138       TAPSE   2.29           TDI SEPTAL   0.08           TDI LATERAL   0.08           TSH     1.874         WBC 5.86     5.01   5.00       ZLVIDD   -2.82           ZLVIDS   -1.79                    Significant Imaging: I have reviewed all pertinent imaging results/findings.    Assessment/Plan:     Active Diagnoses:    Diagnosis Date Noted POA    PRINCIPAL PROBLEM:  Tibia/fibula fracture, left,  closed, initial encounter [S82.202A, S82.402A] 10/24/2023 Yes    Trauma [T14.90XA] 10/25/2023 Unknown      Problems Resolved During this Admission:     Patient is status post left tibial shaft open reduction internal fixation with a tibial nail.  He has been doing well.  Weightbearing as tolerated left lower extremity.  Participating with therapy.  Mild confusion at baseline- unchanged  Continue lovenox for DVT ppx during stay followed by aspirin 81 mg BID at discharge.   Continue multimodal pain control  Incisions open to air. Showers okay with antibacterial soap and water. Pat dry. No ointments or creams  Follow up with Ashlie Gilbert in approx 3 weeks for wound check and repeat x rays.   Stable for discharge when ready, referral sent to [facility] per  note, appreciate their help with this patient. Please reach out and orders will be placed at that time.   Hospitalist following as well.   DC when ready.    The above findings, diagnostics, and treatment plan were discussed with Dr. Ramos who is in agreement with the plan of care except as stated in additional documentation.     Alexandra Blair Lemaire, PA-C [facility]   Orthopedic Trauma

## 2023-11-02 NOTE — PT/OT/SLP RE-EVAL
Physical Therapy Re-Evaluation    Patient Name:  Artemio Ramsey   MRN:  [id number]    Recommendations:     Discharge therapy intensity: high intensity   Discharge Equipment Recommendations: walker, rolling, bath bench   Barriers to discharge: Impaired mobility    Assessment:     Artemio Ramsey is a 67 y.o. male admitted with a medical diagnosis of Tibia/fibula fracture, left, closed, initial encounter, s/p IMN tibial shaft.  He presents with the following impairments/functional limitations: weakness, decreased ROM, impaired endurance, pain, orthopedic precautions, impaired functional mobility. At baseline, pt ambulates with quad cane and lives with his wife. Currently, the pt is requiring Min-CGA for all mobility and demonstrates good rehab potential. Pt tolerated 50ft of gait today with no dizziness. Educated pt on ROM to LLE.     Rehab Prognosis: Good; patient would benefit from acute skilled PT services to address these deficits and reach maximum level of function.    Recent Surgery: Procedure(s) (LRB):  INSERTION, INTRAMEDULLARY LOPEZ, TIBIA (Left) 8 Days Post-Op    Plan:     During this hospitalization, patient to be seen 6 x/week to address the identified rehab impairments via gait training, therapeutic activities, therapeutic exercises and progress toward the following goals:    Plan of Care Expires:  23    Subjective     Chief Complaint: none  Patient/Family Comments/goals: to get his leg right  Pain/Comfort:  Pain Rating 1: 0/10    Patients cultural, spiritual, Scientologist conflicts given the current situation: no    Objective:     Communicated with nurse prior to session.  Patient found up in chair with peripheral IV (chair alarm)  upon PT entry to room.    General Precautions: Standard, fall  Orthopedic Precautions:LLE weight bearing as tolerated (full ROM knee and ankle)   Braces: N/A  Respiratory Status: Room air  Blood Pressure:     sittin/74, 53 HR  Standing: unable to read due to error,  asymptomatic  After ambulation: 100/63, 58 HR      Exams:  Cognitive Exam:  Patient is oriented to Person, Place, Time, and Situation  Sensation:    -       Intact  RLE ROM: WNL  RLE Strength: WNL  LLE ROM: Deficits: impaired DF ROM  LLE Strength: Deficits: grossly 3/5  Skin integrity:  incisions/staples to LLE      Functional Mobility:  Bed Mobility:     Sit to Supine: minimum assistance  Transfers:     Sit to Stand:  minimum assistance with rolling walker  Gait: 50ft with RW and CGA, slow antalgic gait, decreased heel strike LLE.      AM-PAC 6 CLICK MOBILITY  Total Score:17       Treatment & Education:    Patient provided with verbal education education regarding LLE ROm.  Understanding was verbalized.     Patient left supine with all lines intact, call button in reach, and bed alarm on.    GOALS:   Multidisciplinary Problems       Physical Therapy Goals          Problem: Physical Therapy    Goal Priority Disciplines Outcome Goal Variances Interventions   Physical Therapy Goal     PT, PT/OT Ongoing, Progressing     Description: Goals to be met by: 23     Patient will increase functional independence with mobility by performin. Supine to sit with [place]  2. Sit to supine with [place]  3. Sit to stand transfer with Modified [place]  4. Gait  x 200 feet with Modified [place] using Rolling Walker.   5. Ascend/descend 4 stairs with no Handrails & Stand-by Assistance                         History:     Past Medical History:   Diagnosis Date    Hypertension        Past Surgical History:   Procedure Laterality Date    INSERTION OF INTRAMEDULLARY NAIL INTO TIBIA Left 10/25/2023    Procedure: INSERTION, INTRAMEDULLARY LOPEZ, TIBIA;  Surgeon: Jose Ramos DO;  Location: [facility];  Service: Orthopedics;  Laterality: Left;  supine vascular bed bone foam c arm synthes       Time Tracking:     PT Received On: 23  PT Start Time: 1505     PT Stop Time: 1525  PT Total Time (min): 20 min      Billable Minutes: Re-eval 20      11/02/2023

## 2023-11-03 LAB
ALBUMIN SERPL-MCNC: 3.3 G/DL (ref 3.4–4.8)
ALBUMIN/GLOB SERPL: 1 RATIO (ref 1.1–2)
ALP SERPL-CCNC: 62 UNIT/L (ref 40–150)
ALT SERPL-CCNC: 26 UNIT/L (ref 0–55)
AST SERPL-CCNC: 27 UNIT/L (ref 5–34)
BASOPHILS # BLD AUTO: 0.03 X10(3)/MCL
BASOPHILS NFR BLD AUTO: 0.5 %
BILIRUB SERPL-MCNC: 0.7 MG/DL
BUN SERPL-MCNC: 16.5 MG/DL (ref 8.4–25.7)
CALCIUM SERPL-MCNC: 9.4 MG/DL (ref 8.8–10)
CHLORIDE SERPL-SCNC: 101 MMOL/L (ref 98–107)
CO2 SERPL-SCNC: 27 MMOL/L (ref 23–31)
CREAT SERPL-MCNC: 0.7 MG/DL (ref 0.73–1.18)
EOSINOPHIL # BLD AUTO: 0.11 X10(3)/MCL (ref 0–0.9)
EOSINOPHIL NFR BLD AUTO: 1.9 %
ERYTHROCYTE [DISTWIDTH] IN BLOOD BY AUTOMATED COUNT: 15.1 % (ref 11.5–17)
GFR SERPLBLD CREATININE-BSD FMLA CKD-EPI: >60 MLS/MIN/1.73/M2
GLOBULIN SER-MCNC: 3.2 GM/DL (ref 2.4–3.5)
GLUCOSE SERPL-MCNC: 91 MG/DL (ref 82–115)
HCT VFR BLD AUTO: 36.2 % (ref 42–52)
HGB BLD-MCNC: 11.8 G/DL (ref 14–18)
IMM GRANULOCYTES # BLD AUTO: 0.03 X10(3)/MCL (ref 0–0.04)
IMM GRANULOCYTES NFR BLD AUTO: 0.5 %
LYMPHOCYTES # BLD AUTO: 1.85 X10(3)/MCL (ref 0.6–4.6)
LYMPHOCYTES NFR BLD AUTO: 31.8 %
MCH RBC QN AUTO: 28 PG (ref 27–31)
MCHC RBC AUTO-ENTMCNC: 32.6 G/DL (ref 33–36)
MCV RBC AUTO: 85.8 FL (ref 80–94)
MONOCYTES # BLD AUTO: 0.79 X10(3)/MCL (ref 0.1–1.3)
MONOCYTES NFR BLD AUTO: 13.6 %
NEUTROPHILS # BLD AUTO: 3 X10(3)/MCL (ref 2.1–9.2)
NEUTROPHILS NFR BLD AUTO: 51.7 %
NRBC BLD AUTO-RTO: 0 %
PLATELET # BLD AUTO: 359 X10(3)/MCL (ref 130–400)
PMV BLD AUTO: 11 FL (ref 7.4–10.4)
POTASSIUM SERPL-SCNC: 4.8 MMOL/L (ref 3.5–5.1)
PROT SERPL-MCNC: 6.5 GM/DL (ref 5.8–7.6)
RBC # BLD AUTO: 4.22 X10(6)/MCL (ref 4.7–6.1)
SODIUM SERPL-SCNC: 139 MMOL/L (ref 136–145)
WBC # SPEC AUTO: 5.81 X10(3)/MCL (ref 4.5–11.5)

## 2023-11-03 PROCEDURE — 97116 GAIT TRAINING THERAPY: CPT | Mod: CQ

## 2023-11-03 PROCEDURE — 97110 THERAPEUTIC EXERCISES: CPT | Mod: CQ

## 2023-11-03 PROCEDURE — 63600175 PHARM REV CODE 636 W HCPCS

## 2023-11-03 PROCEDURE — 96372 THER/PROPH/DIAG INJ SC/IM: CPT

## 2023-11-03 PROCEDURE — 99900035 HC TECH TIME PER 15 MIN (STAT)

## 2023-11-03 PROCEDURE — G0378 HOSPITAL OBSERVATION PER HR: HCPCS

## 2023-11-03 PROCEDURE — 25000003 PHARM REV CODE 250

## 2023-11-03 PROCEDURE — 94761 N-INVAS EAR/PLS OXIMETRY MLT: CPT

## 2023-11-03 PROCEDURE — 97535 SELF CARE MNGMENT TRAINING: CPT | Mod: CO

## 2023-11-03 RX ADMIN — METHOCARBAMOL 500 MG: 500 TABLET ORAL at 06:11

## 2023-11-03 RX ADMIN — GABAPENTIN 300 MG: 300 CAPSULE ORAL at 09:11

## 2023-11-03 RX ADMIN — OXYCODONE HYDROCHLORIDE 5 MG: 5 TABLET ORAL at 08:11

## 2023-11-03 RX ADMIN — ENOXAPARIN SODIUM 40 MG: 40 INJECTION SUBCUTANEOUS at 09:11

## 2023-11-03 RX ADMIN — ENOXAPARIN SODIUM 40 MG: 40 INJECTION SUBCUTANEOUS at 08:11

## 2023-11-03 RX ADMIN — DOCUSATE SODIUM 100 MG: 100 CAPSULE, LIQUID FILLED ORAL at 08:11

## 2023-11-03 RX ADMIN — GABAPENTIN 300 MG: 300 CAPSULE ORAL at 08:11

## 2023-11-03 NOTE — PT/OT/SLP PROGRESS
Physical Therapy Treatment    Patient Name:  Artemio Ramsey   MRN:  [id number]    Recommendations:     Discharge Recommendations:      Discharge Equipment Recommendations: walker, rolling, bath bench     Subjective     Patient awake and cooperative.     Objective:     General Precautions: Standard, fall   Orthopedic Precautions:LLE weight bearing as tolerated (full ROM knee and ankle)   Braces:    Respiratory Status: Room air  Communicated with nurse prior to treatment.     Functional Mobility:    Rolling:Independent  Supine to sit:Supervision or Set-up Assistance  Sit to stand transfer: Stand-by Assistance  Bed to chair transfer:Stand-by Assistance    /66. Gait 65 ft and 15 ft with RW CGA and v/c for sequence. Pt performed LE PRE's to increase strenth, ROM, and endurance to improve overall independence. Improved ROM to LLE and no hypotensive episodes.     Education Provided:  Role and goals of PT, transfer training, bed mobility, gait training, balance training, safety awareness, assistive device, strengthening exercises, and importance of participating in PT to return to PLOF.         Skin Integrity:  staples intact    PT interventions performed during the course of today's session in an effort to prevent and/or reduce acquired pressure injuries:   Therapeutic positioning completed       GOALS:   Multidisciplinary Problems       Physical Therapy Goals          Problem: Physical Therapy    Goal Priority Disciplines Outcome Goal Variances Interventions   Physical Therapy Goal     PT, PT/OT Ongoing, Progressing     Description: Goals to be met by: 23     Patient will increase functional independence with mobility by performin. Supine to sit with [place]  2. Sit to supine with [place]  3. Sit to stand transfer with Modified [place]  4. Gait  x 200 feet with Modified [place] using Rolling Walker.   5. Ascend/descend 4 stairs with no Handrails & Stand-by Assistance                          Assessment:     Artemio Ramsey is a 67 y.o. male admitted with a medical diagnosis of Tibia/fibula fracture, left, closed, initial encounter.     Patient Active Problem List   Diagnosis    Tibia/fibula fracture, left, closed, initial encounter    Trauma        Rehab Prognosis: Good; patient would benefit from acute skilled PT services to address these deficits and reach maximum level of function.    Recent Surgery: Procedure(s) (LRB):  INSERTION, INTRAMEDULLARY LOPEZ, TIBIA (Left) 9 Days Post-Op    Plan:     During this hospitalization, patient to be seen 6 x/week to address the identified rehab impairments via gait training, therapeutic activities, therapeutic exercises and progress toward the following goals:    Plan of Care Expires:  11/26/23    Billable Minutes     Billable Minutes: Gait Training 14 and Therapeutic Exercise 10    Treatment Type: Treatment  PT/PTA: PTA     Number of PTA visits since last PT visit: 1 11/03/2023

## 2023-11-03 NOTE — PROGRESS NOTES
[facility] MEDICINE ~ CONSULTATION PROGRESS NOTE        CHIEF COMPLAINT   Hospital follow up    HOSPITAL COURSE   67 yr old male who presented to ED on 10/24 via air med after tripping on a wooden deck and sustaining a left tib-fib fracture. Underwent surgery repair with intramedullary implant on 10/25. Has been doing well. Ambulating with therapy and pain well controlled. Consulted for episodes of bradycardia and medical management. Has history of hypertension but not on any meds prior to admission.    Orthostatic vitals were checked, positive improved with iv hydration.    Today  Seen and examined this morning.  He says that he is doing well and no complaints of orthostasis.  Nurse reported that he is orthostatic.  I will discontinue his muscle relaxer.  Vitals this morning looks much better.        OBJECTIVE/PHYSICAL EXAM     VITAL SIGNS (MOST RECENT):  Temp: 98 °F (36.7 °C) (11/03/23 0742)  Pulse: (!) 56 (11/03/23 0742)  Resp: 18 (11/03/23 0742)  BP: 104/66 (11/03/23 0742)  SpO2: 99 % (11/03/23 1000) VITAL SIGNS (24 HOUR RANGE):  Temp:  [97.6 °F (36.4 °C)-98.2 °F (36.8 °C)] 98 °F (36.7 °C)  Pulse:  [56-68] 56  Resp:  [17-19] 18  SpO2:  [96 %-99 %] 99 %  BP: ()/(63-81) 104/66   GENERAL: In no acute distress, afebrile  HEENT:  CHEST: Clear to auscultation bilaterally  HEART: S1, S2, no appreciable murmur  ABDOMEN: Soft, nontender, BS +  MSK: Warm, no lower extremity edema, no clubbing or cyanosis  NEUROLOGIC: Alert and oriented x4, moving all extremities with good strength   INTEGUMENTARY:  Left lower extremity with operative incision healing  PSYCHIATRY:        ASSESSMENT/PLAN   Orthostatic hypotension   Sinus bradycardia with first-degree AV block  Left tib-fib fracture-status post ORIF      Orthopedic surgery primary team.  Internal medicine consulted for bradycardia and orthostatic hypotension.    Discontinue muscle relaxer as that can also contribute to  orthostatics.    Orthostatic seem to be much better.  Discontinue IV fluids.  Change to regular diet to allow some salt intake.  Continue PT and OT  Pending rehab placement  Discontinue daily unnecessary labs.  Labs have been completely stable for the last 11 days.    DVT prophylaxis:     Anticipated discharge and disposition:   __________________________________________________________________________    LABS/MICRO/MEDS/DIAGNOSTICS       LABS  Recent Labs     11/03/23  0350      K 4.8   CHLORIDE 101   CO2 27   BUN 16.5   CREATININE 0.70*   GLUCOSE 91   CALCIUM 9.4   ALKPHOS 62   AST 27   ALT 26   ALBUMIN 3.3*     Recent Labs     11/03/23  0350   WBC 5.81   RBC 4.22*   HCT 36.2*   MCV 85.8          MICROBIOLOGY  Microbiology Results (last 7 days)       ** No results found for the last 168 hours. **               MEDICATIONS   docusate sodium  100 mg Oral BID    enoxparin  40 mg Subcutaneous Q12H    gabapentin  300 mg Oral TID    polyethylene glycol  17 g Oral BID         INFUSIONS         DIAGNOSTIC TESTS  X-Ray Tibia Fibula 2 View Left   Final Result      As above.         Electronically signed by: Vega Jain   Date:    10/25/2023   Time:    13:16      SURG FL Surgery Fluoro Usage   Final Result      X-Ray Tibia Fibula 2 View Left   Final Result      Improved alignment of the tibial and fibular fractures following reduction.         Electronically signed by: Shayy Carmichael   Date:    10/24/2023   Time:    15:37      X-Ray Chest 1 View   Final Result      Left basilar subsegmental atelectasis.         Electronically signed by: Shayy Carmichael   Date:    10/24/2023   Time:    15:36      X-Ray Tibia Fibula 2 View Left   Final Result      Fractures as above.         Electronically signed by: Duane Muller   Date:    10/24/2023   Time:    15:00      X-Ray Knee Complete 4 or More Views Left   Final Result      Fractures as above.         Electronically signed by: Duane Muller  "  Date:    10/24/2023   Time:    14:56      X-Ray Ankle Complete Left   Final Result      No acute abnormality identified in the ankle.         Electronically signed by: Shayy Carmichael   Date:    10/24/2023   Time:    15:11           No results found for: "EF"       NUTRITION STATUS  Patient meets ASPEN criteria for   malnutrition of   per RD assessment as evidenced by:                       A minimum of two characteristics is recommended for diagnosis of either severe or non-severe malnutrition.       Case related differential diagnoses have been reviewed; assessment and plan has been documented. I have personally reviewed the labs and test results that are currently available; I have reviewed the patients medication list. I have reviewed the consulting providers recommendations. I have reviewed or attempted to review medical records based upon their availability.  All of the patient's and/or family's questions have been addressed and answered to the best of my ability.  I will continue to monitor closely and make adjustments to medical management as needed.  This document was created using M*Modal Fluency Direct.  Transcription errors may have been made.  Please contact me if any questions may rise regarding documentation to clarify transcription.        Robe Fonseca MD   Internal Medicine  Department of Hospital Medicine  [facility] - Ortho Neuro               "

## 2023-11-03 NOTE — PROGRESS NOTES
"[facility]  [facility]    Patient Name: Artemio Ramsey  MRN: [id number]  Admission Date: 10/24/2023  Hospital Length of Stay: 1 days  Attending Provider: Jose Ramos DO  Primary Care Provider: Cherelle, Primary Doctor  Follow-up For: Procedure(s) (LRB):  INSERTION, INTRAMEDULLARY LPOEZ, TIBIA (Left)    Post-Operative Day: 9 Days Post-Op  Subjective:     Principal Problem:Tibia/fibula fracture, left, closed, initial encounter    Principal Orthopedic Problem: 9 Days Post-Op   Left tibial nail    Interval History:  Patient doing well.  He is awake in bed, alert, pain well controlled.  He is mobilizing with therapy. No complaints. Awaiting placement to [facility] rehab. Elder affairs involved making placement more complex.    Review of patient's allergies indicates:  No Known Allergies    Current Facility-Administered Medications   Medication    docusate sodium capsule 100 mg    enoxaparin injection 40 mg    gabapentin capsule 300 mg    HYDROmorphone (PF) injection 1 mg    influenza 65up-adj (QUADRIVALENT ADJUVANTED PF) vaccine 0.5 mL    magnesium hydroxide 400 mg/5 ml suspension 2,400 mg    melatonin tablet 6 mg    oxyCODONE immediate release tablet 5 mg    oxyCODONE immediate release tablet Tab 10 mg    pneumoc 20-evelio conj-dip cr(PF) (PREVNAR-20 (PF)) injection Syrg 0.5 mL    polyethylene glycol packet 17 g    sodium chloride 0.9% flush 10 mL     Objective:     Vital Signs (Most Recent):  Temp: 98 °F (36.7 °C) (11/03/23 0742)  Pulse: (!) 56 (11/03/23 0742)  Resp: 18 (11/03/23 0742)  BP: 104/66 (11/03/23 0742)  SpO2: 99 % (11/03/23 0742) Vital Signs (24h Range):  Temp:  [97.6 °F (36.4 °C)-98.2 °F (36.8 °C)] 98 °F (36.7 °C)  Pulse:  [56-68] 56  Resp:  [17-19] 18  SpO2:  [96 %-99 %] 99 %  BP: ()/(63-81) 104/66     Weight: 69.9 kg (154 lb)  Height: 5' 11" (180.3 cm)  Body mass index is 21.48 kg/m².      Intake/Output Summary (Last 24 hours) at 11/3/2023 0936  Last data filed at " 11/3/2023 0623  Gross per 24 hour   Intake 480 ml   Output 1100 ml   Net -620 ml         Physical Exam:     General the patient is alert  no acute distress nontoxic-appearing appropriate affect.    Constitutional: Vital signs are examined and stable.  Resp: No signs of labored breathing    LLE: -Skin: Dressing CDI, No signs of new abrasions or lacerations, no scars           -MSK: EHL/FHL, Gastroc/Tib anterior Strength 5/5           -Neuro:  Sensation intact to light touch L3-S1 dermatomes           -Lymphatic: No signs of lymphadenopathy           -CV: Capillary refill is less than 2 seconds. DP/PT pulses 2/4. Compartments soft and compressible                          Diagnostic Findings:   Significant Labs:   Recent Lab Results         11/03/23  0350   11/02/23  0420   11/01/23  1326   11/01/23  0355        Albumin/Globulin Ratio 1.0   1.0     0.9       Albumin 3.3   3.1     2.9       ALP 62   60     59       ALT 26   27     25       Ao peak ashley     1.21         Ao VTI     23.60         AST 27   25     27       AV mean gradient     3         AV index (prosthetic)     0.86         AV peak gradient     6         AV Velocity Ratio     0.77         Baso # 0.03   0.03     0.03       Basophil % 0.5   0.5     0.6       BILIRUBIN TOTAL 0.7   0.8     0.9       BSA     1.87         BUN 16.5   16.3     16.6       Calcium 9.4   9.3     8.8       Chloride 101   100     103       CO2 27   30     27       Creatinine 0.70   0.82     0.75       CRP   18.90           Left Ventricle Relative Wall Thickness     0.52         E/A ratio     0.93         E/E' ratio     10.63         eGFR >60   >60     >60       Eos # 0.11   0.13     0.12       Eosinophil % 1.9   2.2     2.4       E wave deceleration time     169.00         FS     35         Globulin, Total 3.2   3.1     3.1       Glucose 91   97     106       Hematocrit 36.2   35.5     36.1       Hemoglobin 11.8   11.6     11.6       Immature Grans (Abs) 0.03   0.03     0.02        Immature Granulocytes 0.5   0.5     0.4       IVSd     1.02         LA size     3.80         LA volume     43.90         LA Volume Index (Mod)     23.2         LV LATERAL E/E' RATIO     10.63         LV SEPTAL E/E' RATIO     10.63         LV EDV BP     69.60         LV Diastolic Volume Index     36.83         LVIDd     3.99         LVIDs     2.60         LV mass     131.99         LV Mass Index     70         Left Ventricular Outflow Tract Mean Gradient     2.00         Left Ventricular Outflow Tract Mean Velocity     0.57         LVOT peak brayan     0.93         LVOT peak VTI     20.20         LV ESV BP     24.60         LV Systolic Volume Index     13.0         Lymph # 1.85   2.18     1.79       LYMPH % 31.8   37.2     35.7       MCH 28.0   28.4     28.1       MCHC 32.6   32.7     32.1       MCV 85.8   86.8     87.4       Mean e'     0.08         Mono # 0.79   0.96     0.89       Mono % 13.6   16.4     17.8       MPV 11.0   10.4     10.4       MV Peak A Brayan     0.91         MV Peak E Brayan     0.85         Neut # 3.00   2.53     2.16       Neut % 51.7   43.2     43.1       nRBC 0.0   0.0     0.0       Azul's Biplane MOD Ejection Fraction     57         Platelet Count 359   297     246       Potassium 4.8   4.5     4.3       Prealbumin   15.3           PROTEIN TOTAL 6.5   6.2     6.0       PV peak gradient     3         PV PEAK VELOCITY     0.92         Posterior Wall     1.04         Est. RA pres     3         RBC 4.22   4.09     4.13       RDW 15.1   15.2     15.1       Sodium 139   138     137       TAPSE     2.29         TDI SEPTAL     0.08         TDI LATERAL     0.08         TSH       1.874       WBC 5.81   5.86     5.01       ZLVIDD     -2.82         ZLVIDS     -1.79                  Significant Imaging: I have reviewed all pertinent imaging results/findings.    Assessment/Plan:     Active Diagnoses:    Diagnosis Date Noted POA    PRINCIPAL PROBLEM:  Tibia/fibula fracture, left, closed, initial encounter  [S82.202A, S82.402A] 10/24/2023 Yes    Trauma [T14.90XA] 10/25/2023 Unknown      Problems Resolved During this Admission:     Patient is status post left tibial shaft open reduction internal fixation with a tibial nail.  He has been doing well.  Weightbearing as tolerated left lower extremity.  Participating with therapy.    Continue lovenox for DVT ppx during stay followed by aspirin 81 mg BID at discharge.   Continue multimodal pain control  Incisions open to air. Showers okay with antibacterial soap and water. Pat dry. No ointments or creams  Follow up with Ashlie Gilbert in approx 3 weeks for wound check and repeat x rays.   Stable for discharge when ready, referral sent to [facility] rehab per CM note, appreciate their help with this patient. Please reach out and orders will be placed at that time.   Hospitalist following as well.   DC when ready.  Staples out at 3 weeks post op    The above findings, diagnostics, and treatment plan were discussed with Dr. Ramos who is in agreement with the plan of care except as stated in additional documentation.     Alexandra Blair Lemaire, PA-C [facility]   Orthopedic Trauma

## 2023-11-03 NOTE — PT/OT/SLP PROGRESS
Occupational Therapy   Treatment    Name: Artemio Ramsey  MRN: [id number]  Admitting Diagnosis:  Tibia/fibula fracture, left, closed, initial encounter  9 Days Post-Op    Recommendations:     Recommended therapy intensity at discharge: High Intensity Therapy   Discharge Equipment Recommendations:  bath bench, walker, rolling  Barriers to discharge:       Assessment:     Artemio Ramsey is a 67 y.o. male with a medical diagnosis of Tibia/fibula fracture, left, closed, initial encounter. Performance deficits affecting function are weakness, gait instability, impaired endurance, impaired balance, decreased lower extremity function, decreased safety awareness, impaired self care skills, impaired functional mobility.     Rehab Prognosis:  Good; patient would benefit from acute skilled OT services to address these deficits and reach maximum level of function.       Plan:     Patient to be seen 3 x/week to address the above listed problems via self-care/home management, therapeutic activities, therapeutic exercises  Plan of Care Expires: 11/23/23  Plan of Care Reviewed with: patient    Subjective     Pain/Comfort:  Pain Rating 1: 0/10    Objective:     Communicated with: RN prior to session.  Patient found up in chair with telemetry (chair alarm) upon OT entry to room.    General Precautions: Standard, fall    Orthopedic Precautions:LLE weight bearing as tolerated  Braces: N/A  Respiratory Status: Room air  Vital Signs: Orthostatics: Supine N/A, Sitting 118/69, Standing 91/54, pt asymptomatic     Occupational Performance:     Bed Mobility:    Patient completed Sit to Supine with stand by assistance and extended time to clear BLE into bed.     Functional Mobility/Transfers:  Patient completed Sit <> Stand Transfer with contact guard assistance  with  rolling walker   Functional Mobility: walked throughout room with CGA and RW.     Activities of Daily Living:  Grooming: completed oral hygiene standing at sink with CGA.   Toileting:  performed toilet t/f with Min A for sit>stand from low surface.   LE dressing: Max A to don socks.     Therapeutic Positioning    OT interventions performed during the course of today's session in an effort to prevent and/or reduce acquired pressure injuries:   Education was provided on benefits of and recommendations for therapeutic positioning    Skin assessment: all bony prominences were assessed    Findings: no redness or breakdown noted    AMPAC 6 Click ADL:      Patient Education:  Patient provided with verbal education education regarding OT role/goals/POC, fall prevention, and safety awareness.  Understanding was verbalized, however additional teaching warranted.      Patient left supine with all lines intact and call button in reach    GOALS:   Multidisciplinary Problems       Occupational Therapy Goals          Problem: Occupational Therapy    Goal Priority Disciplines Outcome Interventions   Occupational Therapy Goal     OT, PT/OT Ongoing, Progressing    Description: STG: to be met by 11/26/23    Pt will complete grooming standing at sink with LRAD with SBA.  Pt will complete UB dressing independently while seated.  Pt will complete LB dressing with SBA using LRAD and AE prn.   Pt will complete toileting with SBA using LRAD.  Pt will complete functional mobility to/from toilet and toilet transfer with SBA using LRAD.                        Time Tracking:     OT Date of Treatment: 11/03/23  OT Start Time: 1051  OT Stop Time: 1116  OT Total Time (min): 25 min    Billable Minutes:Self Care/Home Management 25    OT/JOCELYNN: JOCELYNN     Number of JOCELYNN visits since last OT visit: 5    11/3/2023

## 2023-11-04 PROCEDURE — 94761 N-INVAS EAR/PLS OXIMETRY MLT: CPT

## 2023-11-04 PROCEDURE — G0378 HOSPITAL OBSERVATION PER HR: HCPCS

## 2023-11-04 PROCEDURE — 97110 THERAPEUTIC EXERCISES: CPT | Mod: CQ

## 2023-11-04 PROCEDURE — 63600175 PHARM REV CODE 636 W HCPCS

## 2023-11-04 PROCEDURE — 25000003 PHARM REV CODE 250

## 2023-11-04 PROCEDURE — 25000003 PHARM REV CODE 250: Performed by: INTERNAL MEDICINE

## 2023-11-04 PROCEDURE — 96372 THER/PROPH/DIAG INJ SC/IM: CPT

## 2023-11-04 PROCEDURE — 97530 THERAPEUTIC ACTIVITIES: CPT | Mod: CQ

## 2023-11-04 RX ORDER — FLUDROCORTISONE ACETATE 0.1 MG/1
100 TABLET ORAL DAILY
Status: DISCONTINUED | OUTPATIENT
Start: 2023-11-04 | End: 2023-11-06 | Stop reason: HOSPADM

## 2023-11-04 RX ADMIN — POLYETHYLENE GLYCOL 3350 17 G: 17 POWDER, FOR SOLUTION ORAL at 09:11

## 2023-11-04 RX ADMIN — ENOXAPARIN SODIUM 40 MG: 40 INJECTION SUBCUTANEOUS at 09:11

## 2023-11-04 RX ADMIN — DOCUSATE SODIUM 100 MG: 100 CAPSULE, LIQUID FILLED ORAL at 09:11

## 2023-11-04 RX ADMIN — OXYCODONE HYDROCHLORIDE 5 MG: 5 TABLET ORAL at 09:11

## 2023-11-04 RX ADMIN — GABAPENTIN 300 MG: 300 CAPSULE ORAL at 09:11

## 2023-11-04 RX ADMIN — FLUDROCORTISONE ACETATE 100 MCG: 0.1 TABLET ORAL at 01:11

## 2023-11-04 RX ADMIN — GABAPENTIN 300 MG: 300 CAPSULE ORAL at 05:11

## 2023-11-04 NOTE — PT/OT/SLP PROGRESS
Physical Therapy Treatment    Patient Name:  Artemio Ramsey   MRN:  [id number]    Recommendations:     Discharge Recommendations: High Intensity Therapy  Discharge Equipment Recommendations: walker, rolling, bath bench  Barriers to discharge:  impaired mobility    Assessment:     Artemio Ramsey is a 67 y.o. male admitted with a medical diagnosis of Tibia/fibula fracture, left, closed, initial encounter.  He presents with the following impairments/functional limitations: weakness, gait instability, impaired endurance, impaired balance, decreased lower extremity function, decreased safety awareness, impaired functional mobility, impaired self care skills .    Rehab Prognosis: Good; patient would benefit from acute skilled PT services to address these deficits and reach maximum level of function.    Recent Surgery: Procedure(s) (LRB):  INSERTION, INTRAMEDULLARY LOPEZ, TIBIA (Left) 10 Days Post-Op    Plan:     During this hospitalization, patient to be seen 6 x/week to address the identified rehab impairments via gait training, therapeutic activities, therapeutic exercises and progress toward the following goals:    Plan of Care Expires:  11/26/23    Subjective     Chief Complaint: waiting to get a bath  Patient/Family Comments/goals: get stronger  Pain/Comfort:  Pain Rating 1: 0/10      Objective:     Communicated with RN prior to session.  Patient found supine with telemetry upon PT entry to room.     General Precautions: Standard, fall  Orthopedic Precautions: LLE weight bearing as tolerated  Braces: N/A  Respiratory Status: Room air  Orthostatic  BP supine 113/69  BP seated 93/60 symptomatic  Performed seated ankle pumps, BUE arom, LAQ. Symptoms did not improve and BP 73/50.  Returned to supine and 97/61    Functional Mobility:  Bed Mobility: SBA  Seated balance SBA  Limited in progress due to orthostatic vitals with symptoms.       AM-PAC 6 CLICK MOBILITY  Turning over in bed (including adjusting bedclothes, sheets and  blankets)?: 3  Sitting down on and standing up from a chair with arms (e.g., wheelchair, bedside commode, etc.): 3  Moving from lying on back to sitting on the side of the bed?: 3  Moving to and from a bed to a chair (including a wheelchair)?: 3  Need to walk in hospital room?: 3  Climbing 3-5 steps with a railing?: 2  Basic Mobility Total Score: 17       Treatment & Education:  Pt performed supine ex 2x10 BLE including SLR, hip abd add, knee flx. Pt transitioned to sit EOB and stated he felt dizzy with blurred vision. BP indicated pt was orthostatic and returned to supine. Pt symptoms subsided and BP returned back WNL. Pt left supine with all needs in reach. Instructed pt to call nurse before getting up.     GOALS:   Multidisciplinary Problems       Physical Therapy Goals          Problem: Physical Therapy    Goal Priority Disciplines Outcome Goal Variances Interventions   Physical Therapy Goal     PT, PT/OT Ongoing, Progressing     Description: Goals to be met by: 23     Patient will increase functional independence with mobility by performin. Supine to sit with Drake  2. Sit to supine with Drake  3. Sit to stand transfer with Modified Drake  4. Gait  x 200 feet with Modified Drake using Rolling Walker.   5. Ascend/descend 4 stairs with no Handrails & Stand-by Assistance                         Time Tracking:     PT Received On: 23  PT Start Time: 1017     PT Stop Time: 1041  PT Total Time (min): 24 min     Billable Minutes: Therapeutic Activity 12 and Therapeutic Exercise 12    Treatment Type: Treatment  PT/PTA: PTA     Number of PTA visits since last PT visit: 2     2023

## 2023-11-04 NOTE — PROGRESS NOTES
"[facility]  [facility] Note    Patient Name: Artemio Ramsey  MRN: [id number]  Admission Date: 10/24/2023  Hospital Length of Stay: 1 days  Attending Provider: Jose Ramos DO  Primary Care Provider: Cherelle, Primary Doctor  Follow-up For: Procedure(s) (LRB):  INSERTION, INTRAMEDULLARY LOPEZ, TIBIA (Left)    Post-Operative Day: 10 Days Post-Op  Subjective:     Principal Problem:Tibia/fibula fracture, left, closed, initial encounter    Principal Orthopedic Problem: 10 Days Post-Op   Left tibial nail    Interval History:  Patient doing well.  He is awake in bed, alert, pain well controlled.  He is mobilizing with therapy. No complaints. Awaiting placement.     Review of patient's allergies indicates:  No Known Allergies    Current Facility-Administered Medications   Medication    docusate sodium capsule 100 mg    enoxaparin injection 40 mg    gabapentin capsule 300 mg    HYDROmorphone (PF) injection 1 mg    influenza 65up-adj (QUADRIVALENT ADJUVANTED PF) vaccine 0.5 mL    magnesium hydroxide 400 mg/5 ml suspension 2,400 mg    melatonin tablet 6 mg    oxyCODONE immediate release tablet 5 mg    oxyCODONE immediate release tablet Tab 10 mg    pneumoc 20-evelio conj-dip cr(PF) (PREVNAR-20 (PF)) injection Syrg 0.5 mL    polyethylene glycol packet 17 g    sodium chloride 0.9% flush 10 mL     Objective:     Vital Signs (Most Recent):  Temp: 97.9 °F (36.6 °C) (11/04/23 0700)  Pulse: (!) 54 (11/04/23 0700)  Resp: 18 (11/04/23 0700)  BP: (!) 116/58 (11/04/23 0700)  SpO2: 100 % (11/04/23 0700) Vital Signs (24h Range):  Temp:  [97.4 °F (36.3 °C)-98.2 °F (36.8 °C)] 97.9 °F (36.6 °C)  Pulse:  [52-68] 54  Resp:  [17-20] 18  SpO2:  [97 %-100 %] 100 %  BP: (100-124)/(58-76) 116/58     Weight: 69.9 kg (154 lb)  Height: 5' 11" (180.3 cm)  Body mass index is 21.48 kg/m².      Intake/Output Summary (Last 24 hours) at 11/4/2023 0832  Last data filed at 11/4/2023 0330  Gross per 24 hour   Intake 360 ml   Output " 425 ml   Net -65 ml         Physical Exam:     General the patient is alert  no acute distress nontoxic-appearing appropriate affect.    Constitutional: Vital signs are examined and stable.  Resp: No signs of labored breathing    LLE: -Skin: Dressing CDI, No signs of new abrasions or lacerations, no scars           -MSK: EHL/FHL, Gastroc/Tib anterior Strength 5/5           -Neuro:  Sensation intact to light touch L3-S1 dermatomes           -Lymphatic: No signs of lymphadenopathy           -CV: Capillary refill is less than 2 seconds. DP/PT pulses 2/4. Compartments soft and compressible                          Diagnostic Findings:   Significant Labs:   Recent Lab Results         11/03/23  0350   11/02/23  0420   11/01/23  1326        Albumin/Globulin Ratio 1.0   1.0         Albumin 3.3   3.1         ALP 62   60         ALT 26   27         Ao peak ashley     1.21       Ao VTI     23.60       AST 27   25         AV mean gradient     3       AV index (prosthetic)     0.86       AV peak gradient     6       AV Velocity Ratio     0.77       Baso # 0.03   0.03         Basophil % 0.5   0.5         BILIRUBIN TOTAL 0.7   0.8         BSA     1.87       BUN 16.5   16.3         Calcium 9.4   9.3         Chloride 101   100         CO2 27   30         Creatinine 0.70   0.82         CRP   18.90         Left Ventricle Relative Wall Thickness     0.52       E/A ratio     0.93       E/E' ratio     10.63       eGFR >60   >60         Eos # 0.11   0.13         Eosinophil % 1.9   2.2         E wave deceleration time     169.00       FS     35       Globulin, Total 3.2   3.1         Glucose 91   97         Hematocrit 36.2   35.5         Hemoglobin 11.8   11.6         Immature Grans (Abs) 0.03   0.03         Immature Granulocytes 0.5   0.5         IVSd     1.02       LA size     3.80       LA volume     43.90       LA Volume Index (Mod)     23.2       LV LATERAL E/E' RATIO     10.63       LV SEPTAL E/E' RATIO     10.63       LV EDV BP      69.60       LV Diastolic Volume Index     36.83       LVIDd     3.99       LVIDs     2.60       LV mass     131.99       LV Mass Index     70       Left Ventricular Outflow Tract Mean Gradient     2.00       Left Ventricular Outflow Tract Mean Velocity     0.57       LVOT peak brayan     0.93       LVOT peak VTI     20.20       LV ESV BP     24.60       LV Systolic Volume Index     13.0       Lymph # 1.85   2.18         LYMPH % 31.8   37.2         MCH 28.0   28.4         MCHC 32.6   32.7         MCV 85.8   86.8         Mean e'     0.08       Mono # 0.79   0.96         Mono % 13.6   16.4         MPV 11.0   10.4         MV Peak A Brayan     0.91       MV Peak E Brayan     0.85       Neut # 3.00   2.53         Neut % 51.7   43.2         nRBC 0.0   0.0         Azul's Biplane MOD Ejection Fraction     57       Platelet Count 359   297         Potassium 4.8   4.5         Prealbumin   15.3         PROTEIN TOTAL 6.5   6.2         PV peak gradient     3       PV PEAK VELOCITY     0.92       Posterior Wall     1.04       Est. RA pres     3       RBC 4.22   4.09         RDW 15.1   15.2         Sodium 139   138         TAPSE     2.29       TDI SEPTAL     0.08       TDI LATERAL     0.08       WBC 5.81   5.86         ZLVIDD     -2.82       ZLVIDS     -1.79                Significant Imaging: I have reviewed all pertinent imaging results/findings.    Assessment/Plan:     Active Diagnoses:    Diagnosis Date Noted POA    PRINCIPAL PROBLEM:  Tibia/fibula fracture, left, closed, initial encounter [S82.202A, S82.402A] 10/24/2023 Yes    Trauma [T14.90XA] 10/25/2023 Unknown      Problems Resolved During this Admission:     Patient is status post left tibial shaft open reduction internal fixation with a tibial nail.  He has been doing well.  Weightbearing as tolerated left lower extremity.  Participating with therapy.    Continue lovenox for DVT ppx during stay followed by aspirin 81 mg BID at discharge.   Continue multimodal pain  control  Incisions open to air. Showers okay with antibacterial soap and water. Pat dry. No ointments or creams  Follow up with Ashlie Gilbert in approx 3 weeks for wound check and repeat x rays.   Hospitalist following as well.   DC when placement is ready.  Staples out at 3 weeks post op    The above findings, diagnostics, and treatment plan were discussed with Dr. Ramos who is in agreement with the plan of care except as stated in additional documentation.     Ashlie Gilbert PA-C  [facility]   Orthopedic Trauma

## 2023-11-05 PROCEDURE — 94761 N-INVAS EAR/PLS OXIMETRY MLT: CPT

## 2023-11-05 PROCEDURE — 63600175 PHARM REV CODE 636 W HCPCS

## 2023-11-05 PROCEDURE — 27000221 HC OXYGEN, UP TO 24 HOURS

## 2023-11-05 PROCEDURE — 96372 THER/PROPH/DIAG INJ SC/IM: CPT

## 2023-11-05 PROCEDURE — 25000003 PHARM REV CODE 250

## 2023-11-05 PROCEDURE — 25000003 PHARM REV CODE 250: Performed by: INTERNAL MEDICINE

## 2023-11-05 PROCEDURE — G0378 HOSPITAL OBSERVATION PER HR: HCPCS

## 2023-11-05 RX ADMIN — ENOXAPARIN SODIUM 40 MG: 40 INJECTION SUBCUTANEOUS at 09:11

## 2023-11-05 RX ADMIN — GABAPENTIN 300 MG: 300 CAPSULE ORAL at 03:11

## 2023-11-05 RX ADMIN — FLUDROCORTISONE ACETATE 100 MCG: 0.1 TABLET ORAL at 08:11

## 2023-11-05 RX ADMIN — ENOXAPARIN SODIUM 40 MG: 40 INJECTION SUBCUTANEOUS at 08:11

## 2023-11-05 RX ADMIN — GABAPENTIN 300 MG: 300 CAPSULE ORAL at 08:11

## 2023-11-05 RX ADMIN — POLYETHYLENE GLYCOL 3350 17 G: 17 POWDER, FOR SOLUTION ORAL at 08:11

## 2023-11-05 RX ADMIN — DOCUSATE SODIUM 100 MG: 100 CAPSULE, LIQUID FILLED ORAL at 08:11

## 2023-11-05 NOTE — PROGRESS NOTES
Hospital Medicine  Progress Note    Patient Name: Artemio Ramsey  MRN: [id number]  Status: OP- Observation   Admission Date: 10/24/2023  Length of Stay: 1  Date of Service: 11/04/2023       CC: hospital follow-up for Orthostatic hypotension       SUBJECTIVE   67 yr old male, presented to ED on 10/24 via air med after tripping on a wooden deck and sustaining a left tib-fib fracture. Underwent ORIF  with intramedullary implant on 10/25. He has been doing well, ambulating with therapy, pain well controlled.  However developed episodes of bradycardia,  consulted for medical management. EKG with NSR, though rates do drop into 50s at times.  He also has a history of hypertension but not on any meds prior to admission.    Orthostatic vitals were checked, shown to be positive.  Patient was given gentle IV hydration.       Today: Patient seen and examined at bedside, and chart reviewed.  Pressures remain border line low, HR does continue to dip into 50s. But more so patient does note lightheadedness when standing.  Notes symptoms have been going on for while even prior to admission.      MEDICATIONS   Scheduled   docusate sodium  100 mg Oral BID    enoxparin  40 mg Subcutaneous Q12H    fludrocortisone  100 mcg Oral Daily    gabapentin  300 mg Oral TID    polyethylene glycol  17 g Oral BID     Continuous Infusions  None      PHYSICAL EXAM   VITALS: T 98.1 °F (36.7 °C)   BP 98/60   P 72   RR 17   O2 98 %    GENERAL: Awake and in NAD  LUNGS: CTA B/L  CVS: Normal rate  GI/: Soft, NT, bowel sounds positive.  EXTREMITIES: No peripheral edema  NEURO: AAOx3  PSYCH: Cooperative      LABS   CBC  Recent Labs     11/02/23 0420 11/03/23  0350   WBC 5.86 5.81   RBC 4.09* 4.22*   HGB 11.6* 11.8*   HCT 35.5* 36.2*   MCV 86.8 85.8   MCH 28.4 28.0   MCHC 32.7* 32.6*   RDW 15.2 15.1    359     CHEM  Recent Labs     11/02/23  0420 11/03/23  0350    139   K 4.5 4.8   CHLORIDE 100 101   CO2 30 27   BUN 16.3 16.5   CREATININE 0.82  0.70*   GLUCOSE 97 91   CALCIUM 9.3 9.4   ALBUMIN 3.1* 3.3*   GLOBULIN 3.1 3.2   ALKPHOS 60 62   ALT 27 26   AST 25 27   BILITOT 0.8 0.7   CRP 18.90*  --          ASSESSMENT   Orthostatic hypotension   Sinus bradycardia with first-degree AV block  Left tib-fib fracture, s/p ORIF    PLAN   Will add Florinef and monitor symptoms  Otherwise continue current management  Post-op care per Ortho          Mike Keith MD  [facility]

## 2023-11-05 NOTE — PROGRESS NOTES
Hospital Medicine  Progress Note    Patient Name: Artemio Ramsey  MRN: [id number]  Status: OP- Observation   Admission Date: 10/24/2023  Length of Stay: 1  Date of Service: 11/05/2023       CC: hospital follow-up for Orthostatic hypotension       SUBJECTIVE   67 yr old male, presented to ED on 10/24 via air med after tripping on a wooden deck and sustaining a left tib-fib fracture. Underwent ORIF  with intramedullary implant on 10/25. He has been doing well, ambulating with therapy, pain well controlled.  However developed episodes of bradycardia,  consulted for medical management. EKG with NSR, though rates do drop into 50s at times.  He also has a history of hypertension but not on any meds prior to admission.    Orthostatic vitals were checked, shown to be positive.  Patient was given gentle IV hydration.   Pressures remained border line low, but more so patient does note lightheadedness when standing; symptoms ongoing even prior to admission.  Therefore started on Florinef    Today: Patient seen and examined at bedside, and chart reviewed.  Pressures seems a little better this morning on Florinef.  Otherwise stable.    MEDICATIONS   Scheduled   docusate sodium  100 mg Oral BID    enoxparin  40 mg Subcutaneous Q12H    fludrocortisone  100 mcg Oral Daily    gabapentin  300 mg Oral TID    polyethylene glycol  17 g Oral BID     Continuous Infusions  None      PHYSICAL EXAM   VITALS: T 97.7 °F (36.5 °C)   /70   P 60   RR 18   O2 97 %    GENERAL: Awake and in NAD  LUNGS: CTA B/L  CVS: Normal rate  GI/: Soft, NT, bowel sounds positive.  EXTREMITIES: No peripheral edema  NEURO: AAOx3  PSYCH: Cooperative      LABS   CBC  Recent Labs     11/03/23  0350   WBC 5.81   RBC 4.22*   HGB 11.8*   HCT 36.2*   MCV 85.8   MCH 28.0   MCHC 32.6*   RDW 15.1          CHEM  Recent Labs     11/03/23  0350      K 4.8   CHLORIDE 101   CO2 27   BUN 16.5   CREATININE 0.70*   GLUCOSE 91   CALCIUM 9.4   ALBUMIN 3.3*    GLOBULIN 3.2   ALKPHOS 62   ALT 26   AST 27   BILITOT 0.7           ASSESSMENT   Orthostatic hypotension   Sinus bradycardia with first-degree AV block  Left tib-fib fracture, s/p ORIF    PLAN   Continue Florinef and monitor pressures, orthostatic symptoms  Otherwise continue current management  Post-op care per Ortho          Mike Keith MD  [facility] Medicine

## 2023-11-05 NOTE — PROGRESS NOTES
"[facility]  Orthopedics  Progress Note    Patient Name: Artemio Ramsey  MRN: [id number]  Admission Date: 10/24/2023  Hospital Length of Stay: 1 days  Attending Provider: Jose Ramos DO  Primary Care Provider: Cherelle, Primary Doctor  Follow-up For: Procedure(s) (LRB):  INSERTION, INTRAMEDULLARY LOPEZ, TIBIA (Left)    Post-Operative Day: 11 Days Post-Op  Subjective:     Principal Problem:Tibia/fibula fracture, left, closed, initial encounter    Principal Orthopedic Problem: 11 Days Post-Op   Left tibial nail    Interval History:  Patient doing well.  He is awake in bed, alert, pain well controlled.  He is mobilizing with therapy. No complaints. Awaiting placement.  Incisions open to air    Review of patient's allergies indicates:  No Known Allergies    Current Facility-Administered Medications   Medication    docusate sodium capsule 100 mg    enoxaparin injection 40 mg    fludrocortisone tablet 100 mcg    gabapentin capsule 300 mg    HYDROmorphone (PF) injection 1 mg    influenza 65up-adj (QUADRIVALENT ADJUVANTED PF) vaccine 0.5 mL    magnesium hydroxide 400 mg/5 ml suspension 2,400 mg    melatonin tablet 6 mg    oxyCODONE immediate release tablet 5 mg    oxyCODONE immediate release tablet Tab 10 mg    pneumoc 20-evelio conj-dip cr(PF) (PREVNAR-20 (PF)) injection Syrg 0.5 mL    polyethylene glycol packet 17 g    sodium chloride 0.9% flush 10 mL     Objective:     Vital Signs (Most Recent):  Temp: 97.9 °F (36.6 °C) (11/05/23 0642)  Pulse: (!) 53 (11/05/23 0642)  Resp: 18 (11/05/23 0642)  BP: 127/68 (11/05/23 0642)  SpO2: 96 % (11/05/23 0642) Vital Signs (24h Range):  Temp:  [97.4 °F (36.3 °C)-98.1 °F (36.7 °C)] 97.9 °F (36.6 °C)  Pulse:  [49-72] 53  Resp:  [17-18] 18  SpO2:  [96 %-100 %] 96 %  BP: ()/(57-70) 127/68     Weight: 69.9 kg (154 lb)  Height: 5' 11" (180.3 cm)  Body mass index is 21.48 kg/m².      Intake/Output Summary (Last 24 hours) at 11/5/2023 1013  Last data filed at 11/5/2023 " 0821  Gross per 24 hour   Intake 360 ml   Output 300 ml   Net 60 ml         Physical Exam:     General the patient is alert  no acute distress nontoxic-appearing appropriate affect.    Constitutional: Vital signs are examined and stable.  Resp: No signs of labored breathing    LLE: -Skin: Dressing CDI, No signs of new abrasions or lacerations, no scars           -MSK: EHL/FHL, Gastroc/Tib anterior Strength 5/5           -Neuro:  Sensation intact to light touch L3-S1 dermatomes           -Lymphatic: No signs of lymphadenopathy           -CV: Capillary refill is less than 2 seconds. DP/PT pulses 2/4. Compartments soft and compressible                          Diagnostic Findings:   Significant Labs:   Recent Lab Results         11/03/23  0350        Albumin/Globulin Ratio 1.0       Albumin 3.3       ALP 62       ALT 26       AST 27       Baso # 0.03       Basophil % 0.5       BILIRUBIN TOTAL 0.7       BUN 16.5       Calcium 9.4       Chloride 101       CO2 27       Creatinine 0.70       eGFR >60       Eos # 0.11       Eosinophil % 1.9       Globulin, Total 3.2       Glucose 91       Hematocrit 36.2       Hemoglobin 11.8       Immature Grans (Abs) 0.03       Immature Granulocytes 0.5       Lymph # 1.85       LYMPH % 31.8       MCH 28.0       MCHC 32.6       MCV 85.8       Mono # 0.79       Mono % 13.6       MPV 11.0       Neut # 3.00       Neut % 51.7       nRBC 0.0       Platelet Count 359       Potassium 4.8       PROTEIN TOTAL 6.5       RBC 4.22       RDW 15.1       Sodium 139       WBC 5.81                Significant Imaging: I have reviewed all pertinent imaging results/findings.    Assessment/Plan:     Active Diagnoses:    Diagnosis Date Noted POA    PRINCIPAL PROBLEM:  Tibia/fibula fracture, left, closed, initial encounter [S82.202A, S82.402A] 10/24/2023 Yes    Trauma [T14.90XA] 10/25/2023 Unknown      Problems Resolved During this Admission:     Patient is status post left tibial shaft open reduction internal  fixation with a tibial nail.  He has been doing well.  Weightbearing as tolerated left lower extremity.  Participating with therapy.    Continue lovenox for DVT ppx during stay followed by aspirin 81 mg BID at discharge.   Continue multimodal pain control  Incisions open to air. Showers okay with antibacterial soap and water. Pat dry. No ointments or creams  Follow up with Ashlie Gilbert in approx 3 weeks for wound check and repeat x rays.   Hospitalist following as well.   DC when placement is ready. Please call once rehab has accepted and discharge orders will be placed. Will continue to follow  Staples out at 3 weeks post op    The above findings, diagnostics, and treatment plan were discussed with Dr. Ramos who is in agreement with the plan of care except as stated in additional documentation.     [facility]   Orthopedic Trauma

## 2023-11-06 VITALS
SYSTOLIC BLOOD PRESSURE: 111 MMHG | HEART RATE: 50 BPM | BODY MASS INDEX: 21.56 KG/M2 | DIASTOLIC BLOOD PRESSURE: 72 MMHG | WEIGHT: 154 LBS | OXYGEN SATURATION: 96 % | HEIGHT: 71 IN | RESPIRATION RATE: 18 BRPM | TEMPERATURE: 98 F

## 2023-11-06 PROBLEM — T14.90XA TRAUMA: Status: RESOLVED | Noted: 2023-10-25 | Resolved: 2023-11-06

## 2023-11-06 PROCEDURE — 97535 SELF CARE MNGMENT TRAINING: CPT

## 2023-11-06 PROCEDURE — 97110 THERAPEUTIC EXERCISES: CPT | Mod: CQ

## 2023-11-06 PROCEDURE — 97168 OT RE-EVAL EST PLAN CARE: CPT | Mod: 59

## 2023-11-06 PROCEDURE — 27000221 HC OXYGEN, UP TO 24 HOURS

## 2023-11-06 PROCEDURE — 97116 GAIT TRAINING THERAPY: CPT | Mod: CQ

## 2023-11-06 PROCEDURE — G0378 HOSPITAL OBSERVATION PER HR: HCPCS

## 2023-11-06 PROCEDURE — 63600175 PHARM REV CODE 636 W HCPCS

## 2023-11-06 PROCEDURE — 25000003 PHARM REV CODE 250

## 2023-11-06 PROCEDURE — 96372 THER/PROPH/DIAG INJ SC/IM: CPT

## 2023-11-06 PROCEDURE — 25000003 PHARM REV CODE 250: Performed by: INTERNAL MEDICINE

## 2023-11-06 PROCEDURE — 94761 N-INVAS EAR/PLS OXIMETRY MLT: CPT

## 2023-11-06 RX ORDER — FLUDROCORTISONE ACETATE 0.1 MG/1
100 TABLET ORAL DAILY
Qty: 30 TABLET | Refills: 0 | Status: SHIPPED | OUTPATIENT
Start: 2023-11-07 | End: 2023-12-07

## 2023-11-06 RX ORDER — ASPIRIN 81 MG/1
81 TABLET ORAL 2 TIMES DAILY
Qty: 60 TABLET | Refills: 0 | Status: SHIPPED | OUTPATIENT
Start: 2023-11-06 | End: 2023-12-06

## 2023-11-06 RX ORDER — OXYCODONE HYDROCHLORIDE 5 MG/1
5 TABLET ORAL EVERY 6 HOURS PRN
Qty: 28 TABLET | Refills: 0 | Status: SHIPPED | OUTPATIENT
Start: 2023-11-06 | End: 2023-11-13

## 2023-11-06 RX ADMIN — DOCUSATE SODIUM 100 MG: 100 CAPSULE, LIQUID FILLED ORAL at 08:11

## 2023-11-06 RX ADMIN — ENOXAPARIN SODIUM 40 MG: 40 INJECTION SUBCUTANEOUS at 08:11

## 2023-11-06 RX ADMIN — FLUDROCORTISONE ACETATE 100 MCG: 0.1 TABLET ORAL at 08:11

## 2023-11-06 RX ADMIN — GABAPENTIN 300 MG: 300 CAPSULE ORAL at 03:11

## 2023-11-06 RX ADMIN — POLYETHYLENE GLYCOL 3350 17 G: 17 POWDER, FOR SOLUTION ORAL at 08:11

## 2023-11-06 RX ADMIN — GABAPENTIN 300 MG: 300 CAPSULE ORAL at 08:11

## 2023-11-06 NOTE — PT/OT/SLP PROGRESS
Physical Therapy Treatment    Patient Name:  Artemio Ramsey   MRN:  [id number]    Recommendations:     Discharge Recommendations:      Discharge Equipment Recommendations: walker, rolling, bath bench     Subjective     Patient awake and alert.     Objective:     General Precautions: Standard, fall   Orthopedic Precautions:LLE weight bearing as tolerated   Braces:    Respiratory Status: Room air  Communicated with nurse prior to treatment.     Functional Mobility:    Rolling:Independent  Supine to sit:Stand-by Assistance  Sit to stand transfer: Stand-by Assistance  Bed to chair transfer:Stand-by Assistance    Gait 125 ft x 2 with RW SBA and improved sequence, distance, and heel strike left. Stair training x 3 step with right rail and mod assist. Gait belt issued so family can assist with getting pt up step and pt finished treatment with LE PRE's to increase strenth, ROM, and endurance to improve overall independence.    Education Provided:  Role and goals of PT, transfer training, bed mobility, gait training, balance training, safety awareness, assistive device, strengthening exercises, and importance of participating in PT to return to PLOF.           Skin Integrity:  staples intact with no drainage    PT interventions performed during the course of today's session in an effort to prevent and/or reduce acquired pressure injuries:   Therapeutic positioning completed       GOALS:   Multidisciplinary Problems       Physical Therapy Goals          Problem: Physical Therapy    Goal Priority Disciplines Outcome Goal Variances Interventions   Physical Therapy Goal     PT, PT/OT Ongoing, Progressing     Description: Goals to be met by: 23     Patient will increase functional independence with mobility by performin. Supine to sit with [place]  2. Sit to supine with [place]  3. Sit to stand transfer with Modified [place]  4. Gait  x 200 feet with Modified [place] using Rolling Walker.   5.  Ascend/descend 4 stairs with no Handrails & Stand-by Assistance                         Assessment:     Artemio Ramsey is a 67 y.o. male admitted with a medical diagnosis of Tibia/fibula fracture, left, closed, initial encounter.     Patient Active Problem List   Diagnosis    Tibia/fibula fracture, left, closed, initial encounter    Trauma        Rehab Prognosis: Good; patient would benefit from acute skilled PT services to address these deficits and reach maximum level of function.    Recent Surgery: Procedure(s) (LRB):  INSERTION, INTRAMEDULLARY LOPEZ, TIBIA (Left) 12 Days Post-Op    Plan:     During this hospitalization, patient to be seen 6 x/week to address the identified rehab impairments via gait training, therapeutic activities, therapeutic exercises and progress toward the following goals:    Plan of Care Expires:  11/26/23    Billable Minutes     Billable Minutes: Gait Training 15 and Therapeutic Exercise 10    Treatment Type: Treatment  PT/PTA: PTA     Number of PTA visits since last PT visit: 3     11/06/2023

## 2023-11-06 NOTE — PLAN OF CARE
11/06/23 1601   Final Note   Assessment Type Final Discharge Note   Anticipated Discharge Disposition Home-Health   Hospital Resources/Appts/Education Provided Post-Acute resouces added to AVS   Post-Acute Status   Post-Acute Authorization Home Health;HME   HME Status Set-up Complete/Auth obtained   Home Health Status Set-up Complete/Auth obtained   Discharge Delays None known at this time     Patient will dc home today with home health services. [facility] has accepted patient. Rolling walker at bedside. Son will provide transportation.

## 2023-11-06 NOTE — PROGRESS NOTES
"[facility]  [facility]  [facility]    Patient Name: Artemio Ramsey  MRN: [id number]  Admission Date: 10/24/2023  Hospital Length of Stay: 1 days  Attending Provider: Jose Ramos DO  Primary Care Provider: Cherelle, Primary Doctor  Follow-up For: Procedure(s) (LRB):  INSERTION, INTRAMEDULLARY LOPEZ, TIBIA (Left)    Post-Operative Day: 12 Days Post-Op  Subjective:     Principal Problem:Tibia/fibula fracture, left, closed, initial encounter    Principal Orthopedic Problem: 12 Days Post-Op   Left tibial nail    Interval History:  Patient doing well.  He is awake in bed, alert, pain well controlled.  He is mobilizing with therapy. No complaints. Awaiting placement.  Incisions open to air. Patient wound like to go home although having complex discharge plan.    Review of patient's allergies indicates:  No Known Allergies    Current Facility-Administered Medications   Medication    docusate sodium capsule 100 mg    enoxaparin injection 40 mg    fludrocortisone tablet 100 mcg    gabapentin capsule 300 mg    HYDROmorphone (PF) injection 1 mg    influenza 65up-adj (QUADRIVALENT ADJUVANTED PF) vaccine 0.5 mL    magnesium hydroxide 400 mg/5 ml suspension 2,400 mg    melatonin tablet 6 mg    oxyCODONE immediate release tablet 5 mg    oxyCODONE immediate release tablet Tab 10 mg    pneumoc 20-evelio conj-dip cr(PF) (PREVNAR-20 (PF)) injection Syrg 0.5 mL    polyethylene glycol packet 17 g    sodium chloride 0.9% flush 10 mL     Objective:     Vital Signs (Most Recent):  Temp: 98.2 °F (36.8 °C) (11/06/23 1040)  Pulse: (!) 50 (11/06/23 1040)  Resp: 18 (11/06/23 1040)  BP: 111/72 (11/06/23 1040)  SpO2: 96 % (11/06/23 1040) Vital Signs (24h Range):  Temp:  [97.8 °F (36.6 °C)-98.2 °F (36.8 °C)] 98.2 °F (36.8 °C)  Pulse:  [50-84] 50  Resp:  [14-18] 18  SpO2:  [94 %-99 %] 96 %  BP: (101-126)/(64-85) 111/72     Weight: 69.9 kg (154 lb)  Height: 5' 11" (180.3 cm)  Body mass index is 21.48 kg/m².      Intake/Output " Summary (Last 24 hours) at 11/6/2023 1104  Last data filed at 11/6/2023 0855  Gross per 24 hour   Intake --   Output 850 ml   Net -850 ml         Physical Exam:     General the patient is alert  no acute distress nontoxic-appearing appropriate affect.    Constitutional: Vital signs are examined and stable.  Resp: No signs of labored breathing    LLE: -Skin: Dressing CDI, No signs of new abrasions or lacerations, no scars           -MSK: EHL/FHL, Gastroc/Tib anterior Strength 5/5           -Neuro:  Sensation intact to light touch L3-S1 dermatomes           -Lymphatic: No signs of lymphadenopathy           -CV: Capillary refill is less than 2 seconds. DP/PT pulses 2/4. Compartments soft and compressible                          Diagnostic Findings:   Significant Labs:   Recent Lab Results       None             Significant Imaging: I have reviewed all pertinent imaging results/findings.    Assessment/Plan:     Active Diagnoses:    Diagnosis Date Noted POA    PRINCIPAL PROBLEM:  Tibia/fibula fracture, left, closed, initial encounter [S82.202A, S82.402A] 10/24/2023 Yes    Trauma [T14.90XA] 10/25/2023 Unknown      Problems Resolved During this Admission:     Patient is status post left tibial shaft open reduction internal fixation with a tibial nail.  He has been doing well.  Weightbearing as tolerated left lower extremity.  Participating with therapy.    Continue lovenox for DVT ppx during stay followed by aspirin 81 mg BID at discharge.   Continue multimodal pain control  Incisions open to air. Showers okay with antibacterial soap and water. Pat dry. No ointments or creams  Follow up with Ashlie Gilbert in approx 3 weeks for wound check and repeat x rays.   Hospitalist following as well.   DC when placement is ready. Please call once rehab has accepted and discharge orders will be placed. Will continue to follow. Discussed with CM. Complex DC plan. PT recommending high in tensity therapy. Inpatient rehab denied, [facility]  will try for SNF placement. Hopeful for discharge early this week.   Staples out at 3 weeks post op    The above findings, diagnostics, and treatment plan were discussed with Dr. Ramos who is in agreement with the plan of care except as stated in additional documentation.     Ashlie Gilbert PA-C  [facility]   Orthopedic Trauma

## 2023-11-06 NOTE — PT/OT/SLP RE-EVAL
Occupational Therapy   Re-evaluation    Name: Artemio Ramsey  MRN: [id number]  Admitting Diagnosis: tib fib fx with ImR  Recent Surgery: Procedure(s) (LRB):  INSERTION, INTRAMEDULLARY LOPEZ, TIBIA (Left) 12 Days Post-Op    Recommendations:     Discharge therapy intensity: High Intensity Therapy   Discharge Equipment Recommendations:  walker, rolling, bath bench  Barriers to discharge:       Assessment:     Artemio Ramsey is a 67 y.o. male with a medical diagnosis of s/p fall with tib fib fx s/p fall.  He presents with the following performance deficits affecting function: weakness, impaired endurance, impaired self care skills, impaired functional mobility.    Rehab Prognosis: good; patient would benefit from acute skilled OT services to address these deficits and reach maximum level of function.       Plan:     Patient to be seen 3 x/week to address the above listed problems via self-care/home management, therapeutic activities, therapeutic exercises  Plan of Care Expires: 11/23/23  Plan of Care Reviewed with: patient    Subjective     Chief Complaint: none stated  Patient/Family Comments/goals: go home    Pain/Comfort:  Pain Rating 1: 0/10    Patients cultural, spiritual, Anabaptist conflicts given the current situation: no    Objective:     OT communicated with nsg prior to session.      Patient was found supine with telemetry upon OT entry to room.    General Precautions: Standard, fall  Orthopedic Precautions: LLE weight bearing as tolerated  Braces: N/A    Vital Signs:     Bed Mobility:    Sup to sit with mod I    Functional Mobility/Transfers:  Sit to stand with sBA  Functional Mobility: ambulated to BR with RW with SBA    Activities of Daily Living:  Doffed and donned socks seated EOB with mod I    AMPAC 6 Click ADL:  AMPAC Total Score:      Functional Cognition:  intact    Visual Perceptual Skills:  intact    Upper Extremity Function:  Right Upper Extremity:   intact    Left Upper Extremity:  intact    Balance:    Good seated and standing with RW      Additional Treatment:  ADL Training: ambulating to BR, toileting and transfers, safety education and ADL's at home    Patient Education:  patient provided with verbal education education regarding OT role/goals/POC, fall prevention, safety awareness, and Discharge/DME recommendations.  Understanding was verbalized.     Patient left  standing up in room with PT    GOALS:   Multidisciplinary Problems       Occupational Therapy Goals          Problem: Occupational Therapy    Goal Priority Disciplines Outcome Interventions   Occupational Therapy Goal     OT, PT/OT Ongoing, Progressing    Description: STG: to be met by 11/26/23    Pt will complete grooming standing at sink with LRAD with SBA.  Pt will complete UB dressing independently while seated.  Pt will complete LB dressing with SBA using LRAD and AE prn.   Pt will complete toileting with SBA using LRAD.  Pt will complete functional mobility to/from toilet and toilet transfer with SBA using LRAD.                        History:     Past Medical History:   Diagnosis Date    Hypertension          Past Surgical History:   Procedure Laterality Date    INSERTION OF INTRAMEDULLARY NAIL INTO TIBIA Left 10/25/2023    Procedure: INSERTION, INTRAMEDULLARY LOPEZ, TIBIA;  Surgeon: Jose Ramos DO;  [facility]: [facility];  Service: Orthopedics;  Laterality: Left;  supine vascular bed bone foam c arm synthes       Time Tracking:     OT Date of Treatment: 11/06/23  OT Start Time: 1040  OT Stop Time: 1100  OT Total Time (min): 20 min    Billable Minutes:Re-eval 10 min  Self Care/Home Management 10 min    11/6/2023

## 2023-11-06 NOTE — PROGRESS NOTES
"Hospital Medicine  Progress Note    Patient Name: Artemio Ramsey  MRN: [id number]  Status: OP- Observation   Admission Date: 10/24/2023  Length of Stay: 1  Date of Service: 11/06/2023       CC: hospital follow-up for Orthostatic hypotension       SUBJECTIVE   67 yr old male, presented to ED on 10/24 via air med after tripping on a wooden deck and sustaining a left tib-fib fracture. Underwent ORIF  with intramedullary implant on 10/25. He has been doing well, ambulating with therapy, pain well controlled.  However developed episodes of bradycardia,  consulted for medical management. EKG with NSR, though rates do drop into 50s at times.  He also has a history of hypertension but not on any meds prior to admission.    Orthostatic vitals were checked, shown to be positive.  Patient was given gentle IV hydration.   Pressures remained border line low, but more so patient does note lightheadedness when standing; symptoms ongoing even prior to admission.  Therefore started on Florinef    Today: Patient seen and examined at bedside, and chart reviewed.  Pressures are definitely more stable, and furthermore patient reports no longer having lightheadedness when standing.  Ortho planning for discharge home today.    MEDICATIONS   Scheduled   docusate sodium  100 mg Oral BID    enoxparin  40 mg Subcutaneous Q12H    fludrocortisone  100 mcg Oral Daily    gabapentin  300 mg Oral TID    polyethylene glycol  17 g Oral BID     Continuous Infusions  None      PHYSICAL EXAM   VITALS: T 98.2 °F (36.8 °C)   /72   P (!) 50   RR 18   O2 96 %    GENERAL: Awake and in NAD  LUNGS: CTA B/L  CVS: Normal rate  GI/: Soft, NT, bowel sounds positive.  EXTREMITIES: No peripheral edema  NEURO: AAOx3  PSYCH: Cooperative      LABS   CBC  No results for input(s): "WBC", "RBC", "HGB", "HCT", "MCV", "MCH", "MCHC", "RDW", "PLT", "RETIC", "ESR" in the last 72 hours.  CHEM  No results for input(s): "NA", "K", "CHLORIDE", "CO2", "BUN", "CREATININE", " ""GLUCOSE", "CALCIUM", "MG", "PHOS", "ALBUMIN", "GLOBULIN", "ALKPHOS", "ALT", "AST", "BILITOT", "LIPASE", "CRP", "LDH", "HAPTOGLOBIN", "FERRITIN", "IRON", "TIBC", "TSH", "FREET4" in the last 72 hours.      ASSESSMENT   Orthostatic hypotension   Sinus bradycardia with first-degree AV block  Left tib-fib fracture, s/p ORIF    PLAN   Ortho planning for discharge to home health today  Stable from our standpoint  Though would continue Florinef for orthostatic symptoms and [place]  [facility] Medicine  "

## 2023-11-06 NOTE — DISCHARGE SUMMARY
Discharge Summary    Admit Date: 10/24/2023     Discharge Date: 11/6/2023     Operative Procedure: INSERTION, INTRAMEDULLARY LOPEZ, TIBIA (Left)     History of Present Illness/Hospital Course: Patient admitted to trauma service following fall through porch resulting in left tibia fracture. He was subsequently taken to the OR with orthopedics for IMN of the left tibia. He tolerated surgery well. He was noted to have some intermittent bradycardia and hospitalist were consulted. Found to be orthostatic and started on florinef with some improvement. His mobility is greatly improved today and he is ambulating 125ft with only stand by assist and able to walk up steps per therapy. Rehab admission has been denied. Patient would like to go home instead of skilled nursing facility. States his family is present to help him.    Discharge Disposition: Home with home pt    Activity: WBAT to affected extremity ; ROMAT    Diet: Resume previous home diet    Medications:      Medication List        START taking these medications      aspirin 81 MG EC tablet  Commonly known as: ECOTRIN  Take 1 tablet (81 mg total) by mouth 2 (two) times a day.     fludrocortisone 0.1 mg Tab  Commonly known as: FLORINEF  Take 1 tablet (100 mcg total) by mouth once daily.  Start taking on: November 7, 2023     oxyCODONE 5 MG immediate release tablet  Commonly known as: ROXICODONE  Take 1 tablet (5 mg total) by mouth every 6 (six) hours as needed for Pain.               Where to Get Your Medications        These medications were sent to [facility] - [address]  [address], [facility] [address]      Phone: [phone]   aspirin 81 MG EC tablet  fludrocortisone 0.1 mg Tab  oxyCODONE 5 MG immediate release tablet          Discharge Instructions:  daily dry dressing changes until follow up. Keep incisions clean and dry. Do not apply ointments or creams.    Follow Up:   Richard/Ashlie Gilbert PA-C   in approx 3 weeks    The above findings, diagnostics, and treatment plan were discussed with Dr. Ramos who is in agreement with the plan of care except as stated in additional documentation.     Ashlie Gilbert PA-C  [facility]   Orthopedic Trauma

## 2023-11-06 NOTE — PLAN OF CARE
11/06/23 1536   Discharge Reassessment   Assessment Type Discharge Planning Reassessment   Did the patient's condition or plan change since previous assessment? Yes   Discharge Plan discussed with: Patient;Adult children   Communicated ARACELI with patient/caregiver Yes  (11-6-23)   Discharge Plan A Home Health   Discharge Plan B Home Health   DME Needed Upon Discharge  walker, rolling   Transition of Care Barriers None   Post-Acute Status   Post-Acute Authorization Home Health;HME   HME Status Referrals Sent   Home Health Status Referrals Sent   Discharge Delays None known at this time     Patient will dc home today son will provide transportation. [facility] FOC obtained and placed in chart.

## 2023-11-07 ENCOUNTER — PATIENT OUTREACH (OUTPATIENT)
Dept: ADMINISTRATIVE | Facility: CLINIC | Age: 68
End: 2023-11-07
Payer: MEDICARE

## 2023-11-07 NOTE — PROGRESS NOTES
C3 nurse attempted to contact Artemio Ramsey  for a TCC post hospital discharge follow up call. No answer. The patient has a scheduled HOSFU appointment with Ashlie Gilbert PA-C (Orthopedic Surgery) on 11/15/2023; For suture removal, For wound re-check Appt time @ 1:15 PM and Renetta Dominguez FNP (Family Medicine) on 11/20/2023; Appt time @ 1:45 AM.    Unable to route message to [facility] for recommended 5-7 day HOSFU parameters.

## 2023-11-07 NOTE — NURSING
Educated patient and family on medications, follow-up appt, and discharge instructions. Patient and son stated that they had no questions or concerns at this time.

## 2023-11-08 NOTE — PROGRESS NOTES
C3 nurse (returned call) made second attempt to contact Artemio Ramsey for a TCC post hospital discharge follow up call. NO answer, No VMB setup .

## 2023-11-09 NOTE — PROGRESS NOTES
C3 nurse made third attempt to contact Artemio Ramsey for a TCC post hospital discharge follow up call. Call was answer, Invalid/ wrong number. Called listed contact again, no answer, no VMB. No other listed numbers in chart.

## 2023-11-21 ENCOUNTER — OFFICE VISIT (OUTPATIENT)
Dept: ORTHOPEDICS | Facility: CLINIC | Age: 68
End: 2023-11-21
Payer: MEDICARE

## 2023-11-21 ENCOUNTER — HOSPITAL ENCOUNTER (OUTPATIENT)
Dept: RADIOLOGY | Facility: CLINIC | Age: 68
Discharge: HOME | End: 2023-11-21
Attending: PHYSICIAN ASSISTANT
Payer: MEDICARE

## 2023-11-21 VITALS
SYSTOLIC BLOOD PRESSURE: 97 MMHG | HEIGHT: 71 IN | HEART RATE: 56 BPM | BODY MASS INDEX: 21.56 KG/M2 | WEIGHT: 154 LBS | DIASTOLIC BLOOD PRESSURE: 63 MMHG

## 2023-11-21 DIAGNOSIS — S82.402A TIBIA/FIBULA FRACTURE, LEFT, CLOSED, INITIAL ENCOUNTER: ICD-10-CM

## 2023-11-21 DIAGNOSIS — S82.402A TIBIA/FIBULA FRACTURE, LEFT, CLOSED, INITIAL ENCOUNTER: Primary | ICD-10-CM

## 2023-11-21 DIAGNOSIS — S82.202A TIBIA/FIBULA FRACTURE, LEFT, CLOSED, INITIAL ENCOUNTER: ICD-10-CM

## 2023-11-21 DIAGNOSIS — S82.202A TIBIA/FIBULA FRACTURE, LEFT, CLOSED, INITIAL ENCOUNTER: Primary | ICD-10-CM

## 2023-11-21 PROCEDURE — 99024 PR POST-OP FOLLOW-UP VISIT: ICD-10-PCS | Mod: ,,, | Performed by: PHYSICIAN ASSISTANT

## 2023-11-21 PROCEDURE — 1159F MED LIST DOCD IN RCRD: CPT | Mod: CPTII,,, | Performed by: PHYSICIAN ASSISTANT

## 2023-11-21 PROCEDURE — 3074F PR MOST RECENT SYSTOLIC BLOOD PRESSURE < 130 MM HG: ICD-10-PCS | Mod: CPTII,,, | Performed by: PHYSICIAN ASSISTANT

## 2023-11-21 PROCEDURE — 73590 X-RAY EXAM OF LOWER LEG: CPT | Mod: LT,,, | Performed by: PHYSICIAN ASSISTANT

## 2023-11-21 PROCEDURE — 3078F DIAST BP <80 MM HG: CPT | Mod: CPTII,,, | Performed by: PHYSICIAN ASSISTANT

## 2023-11-21 PROCEDURE — 1159F PR MEDICATION LIST DOCUMENTED IN MEDICAL RECORD: ICD-10-PCS | Mod: CPTII,,, | Performed by: PHYSICIAN ASSISTANT

## 2023-11-21 PROCEDURE — 99024 POSTOP FOLLOW-UP VISIT: CPT | Mod: ,,, | Performed by: PHYSICIAN ASSISTANT

## 2023-11-21 PROCEDURE — 3074F SYST BP LT 130 MM HG: CPT | Mod: CPTII,,, | Performed by: PHYSICIAN ASSISTANT

## 2023-11-21 PROCEDURE — 3078F PR MOST RECENT DIASTOLIC BLOOD PRESSURE < 80 MM HG: ICD-10-PCS | Mod: CPTII,,, | Performed by: PHYSICIAN ASSISTANT

## 2023-11-21 PROCEDURE — 73590 XR TIBIA FIBULA 2 VIEW LEFT: ICD-10-PCS | Mod: LT,,, | Performed by: PHYSICIAN ASSISTANT

## 2023-11-21 RX ORDER — OXYCODONE HYDROCHLORIDE 5 MG/1
5 CAPSULE ORAL EVERY 4 HOURS PRN
COMMUNITY

## 2023-11-21 NOTE — PROGRESS NOTES
"Subjective:       Patient ID: Artemio Ramsey is a 67 y.o. male.  Chief Complaint   Patient presents with    Left Lower Leg - Post-op Evaluation     4 week f/u from IMN left tibia fx. Ambulates with walker. Currently working with home health physical therapy.         HPI    Patient presents for 4 week follow up IMN left tibia. Ambulates with walker. Working with home physical therapy. States he is doing well overall. He is taking very little pain medication as aspirin for DVT ppx. Denies numbness and tingling distally. He is ambulatory with the use of a walker in regular shoes. Doing very well overall.     ROS:  Constitutional: Denies fever chills  Eyes: No change in vision  ENT: No ringing or current infections  CV: No chest pain  Resp: No labored breathing  MSK: Pain evident at site of injury located in HPI,   Integ: No signs of abrasions or lacerations  Neuro: No numbness or tingling  Lymphatic: No swelling outside the area of injury     Current Outpatient Medications on File Prior to Visit   Medication Sig Dispense Refill    aspirin (ECOTRIN) 81 MG EC tablet Take 1 tablet (81 mg total) by mouth 2 (two) times a day. 60 tablet 0    fludrocortisone (FLORINEF) 0.1 mg Tab Take 1 tablet (100 mcg total) by mouth once daily. 30 tablet 0    oxyCODONE (OXY-IR) 5 mg Cap Take 5 mg by mouth every 4 (four) hours as needed for Pain.       No current facility-administered medications on file prior to visit.          Objective:      BP 97/63   Pulse (!) 56   Ht 5' 11" (1.803 m)   Wt 69.9 kg (154 lb)   BMI 21.48 kg/m²   Physical Exam  General the patient is alert and oriented x3 no acute distress nontoxic-appearing appropriate affect.    Constitutional: Vital signs are examined and stable.  Resp: No signs of labored breathing    Musculoskeletal:     Left lower extremity: incisions well healed without erythema, drainage, signs of dehiscence; compartments are soft and compressible; No pain with ROM at the hip, knee, or ankle; " "minimal tenderness to palpation; dorsi/plantar flexes the foot; SILT distally; BCR distally; DP pulse palpable      Body mass index is 21.48 kg/m².  Ideal body weight: 75.3 kg (166 lb 0.1 oz)  No results found for: "HGBA1C"  Hgb   Date Value Ref Range Status   11/03/2023 11.8 (L) 14.0 - 18.0 g/dL Final   11/02/2023 11.6 (L) 14.0 - 18.0 g/dL Final     Hct   Date Value Ref Range Status   11/03/2023 36.2 (L) 42.0 - 52.0 % Final   11/02/2023 35.5 (L) 42.0 - 52.0 % Final     No results found for: "IRON"  No components found for: "FROLATE"  No results found for: "VEWQJLGI66BP"  WBC   Date Value Ref Range Status   11/03/2023 5.81 4.50 - 11.50 x10(3)/mcL Final   11/02/2023 5.86 4.50 - 11.50 x10(3)/mcL Final       Radiology: 3 view x ray left tibia: hardware intact without signs of loosening or failure. No consolidation as expected in the acute post operative period        Assessment:         1. Tibia/fibula fracture, left, closed, initial encounter  X-Ray Tibia Fibula 2 View Left              Plan:         Follow up in about 6 weeks (around 1/2/2024), or if symptoms worsen or fail to improve.    Artemio was seen today for post-op evaluation.    Diagnoses and all orders for this visit:    Tibia/fibula fracture, left, closed, initial encounter  -     X-Ray Tibia Fibula 2 View Left; Future        -WBAT and ROMAT LLE. Staples removed today without complication  - continue baby aspirin daily for DVT prophylaxis  - May shower and wet incisions, pat dry, no ointments or creams  -follow up in 6 weeks for repeat x rays and evaluation. He is not taking much pain medication at this time, states he does not need refill.   -ED precautions given    The above findings, diagnostics, and treatment plan were discussed with Dr. Ramos who is in agreement with the plan of care except as stated in additional documentation.       Ashlie Gilbert PA-C          Future Appointments   Date Time Provider Department Center   1/10/2024 10:00 AM " Jose Ramos, DO [facility]

## 2023-12-26 ENCOUNTER — DOCUMENT SCAN (OUTPATIENT)
Dept: HOME HEALTH SERVICES | Facility: HOSPITAL | Age: 68
End: 2023-12-26
Payer: MEDICARE

## 2024-01-13 ENCOUNTER — DOCUMENT SCAN (OUTPATIENT)
Dept: HOME HEALTH SERVICES | Facility: HOSPITAL | Age: 69
End: 2024-01-13
Payer: MEDICARE

## 2024-04-30 ENCOUNTER — EXTERNAL HOME HEALTH (OUTPATIENT)
Dept: HOME HEALTH SERVICES | Facility: HOSPITAL | Age: 69
End: 2024-04-30
Payer: MEDICARE

## (undated) DEVICE — IMPLANTABLE DEVICE
Type: IMPLANTABLE DEVICE | Site: TIBIA | Status: NON-FUNCTIONAL
Removed: 2023-10-25

## (undated) DEVICE — ELECTRODE REM POLYHESIVE II

## (undated) DEVICE — STAPLER SKIN PROXIMATE WIDE

## (undated) DEVICE — BANDAGE ELASTIC 3IN ACE NS

## (undated) DEVICE — GOWN SMARTGOWN 3XL XLONG

## (undated) DEVICE — SPONGE GAUZE 16PLY 4X4

## (undated) DEVICE — COVER FULLGUARD SHOE HIGH-TOP

## (undated) DEVICE — DRESSING XEROFORM 5X9IN

## (undated) DEVICE — TAPE SILK 3IN

## (undated) DEVICE — GLOVE PROTEXIS NEU-THERA SZ6

## (undated) DEVICE — BIT DRILL XLN 4.2MM

## (undated) DEVICE — COVER TABLE HVY DTY 60X90IN

## (undated) DEVICE — PADDING WYTEX UNDRCST 6INX4YD

## (undated) DEVICE — SUT VICRYL BR 1 GEN 27 CT-1

## (undated) DEVICE — BIT DRILL 4.2MM 3 FLUTD 145MM

## (undated) DEVICE — SLEEVE OTR PROTCT STRL 12MM

## (undated) DEVICE — DRAPE STERI U-SHAPED 47X51IN

## (undated) DEVICE — GLOVE PROTEXIS BLUE LATEX 9

## (undated) DEVICE — DRAPE C-ARMOR EQUIPMENT COVER

## (undated) DEVICE — GLOVE PROTEXIS HYDROGEL SZ9

## (undated) DEVICE — DRAPE ORTH SPLIT 77X108IN

## (undated) DEVICE — BANDAGE ACE DOUBLE STER 6IN

## (undated) DEVICE — Device

## (undated) DEVICE — PADDING WYTEX UNDRCST 2INX4YD

## (undated) DEVICE — APPLICATOR CHLORAPREP ORN 26ML

## (undated) DEVICE — WIRE GUIDE 3.2MM 400MM
Type: IMPLANTABLE DEVICE | Site: TIBIA | Status: NON-FUNCTIONAL
Removed: 2023-10-25

## (undated) DEVICE — GLOVE PROTEXIS HYDROGEL SZ6